# Patient Record
Sex: FEMALE | Race: WHITE | NOT HISPANIC OR LATINO | URBAN - METROPOLITAN AREA
[De-identification: names, ages, dates, MRNs, and addresses within clinical notes are randomized per-mention and may not be internally consistent; named-entity substitution may affect disease eponyms.]

---
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
## 2022-01-19 ENCOUNTER — OFFICE VISIT (OUTPATIENT)
Dept: FAMILY MEDICINE | Facility: CLINIC | Age: 22
End: 2022-01-19
Payer: COMMERCIAL

## 2022-01-19 VITALS
SYSTOLIC BLOOD PRESSURE: 125 MMHG | RESPIRATION RATE: 15 BRPM | TEMPERATURE: 98.4 F | BODY MASS INDEX: 20.49 KG/M2 | DIASTOLIC BLOOD PRESSURE: 80 MMHG | HEIGHT: 66 IN | OXYGEN SATURATION: 97 % | HEART RATE: 65 BPM | WEIGHT: 127.5 LBS

## 2022-01-19 DIAGNOSIS — Z00.00 ROUTINE GENERAL MEDICAL EXAMINATION AT A HEALTH CARE FACILITY: Primary | ICD-10-CM

## 2022-01-19 DIAGNOSIS — F41.9 ANXIETY: ICD-10-CM

## 2022-01-19 DIAGNOSIS — Z13.220 LIPID SCREENING: ICD-10-CM

## 2022-01-19 DIAGNOSIS — F42.2 MIXED OBSESSIONAL THOUGHTS AND ACTS: ICD-10-CM

## 2022-01-19 PROBLEM — F42.9 OBSESSIVE-COMPULSIVE DISORDER: Status: ACTIVE | Noted: 2018-03-13

## 2022-01-19 PROBLEM — F32.0 DEPRESSION, MAJOR, SINGLE EPISODE, MILD (H): Status: ACTIVE | Noted: 2018-03-13

## 2022-01-19 LAB
ANION GAP SERPL CALCULATED.3IONS-SCNC: 4 MMOL/L (ref 3–14)
BUN SERPL-MCNC: 9 MG/DL (ref 7–30)
CALCIUM SERPL-MCNC: 9 MG/DL (ref 8.5–10.1)
CHLORIDE BLD-SCNC: 104 MMOL/L (ref 94–109)
CHOLEST SERPL-MCNC: 162 MG/DL
CO2 SERPL-SCNC: 30 MMOL/L (ref 20–32)
CREAT SERPL-MCNC: 0.63 MG/DL (ref 0.52–1.04)
FASTING STATUS PATIENT QL REPORTED: NO
GFR SERPL CREATININE-BSD FRML MDRD: >90 ML/MIN/1.73M2
GLUCOSE BLD-MCNC: 84 MG/DL (ref 70–99)
HDLC SERPL-MCNC: 87 MG/DL
LDLC SERPL CALC-MCNC: 55 MG/DL
NONHDLC SERPL-MCNC: 75 MG/DL
POTASSIUM BLD-SCNC: 4.2 MMOL/L (ref 3.4–5.3)
SODIUM SERPL-SCNC: 138 MMOL/L (ref 133–144)
TRIGL SERPL-MCNC: 102 MG/DL

## 2022-01-19 PROCEDURE — 80061 LIPID PANEL: CPT | Performed by: FAMILY MEDICINE

## 2022-01-19 PROCEDURE — 82310 ASSAY OF CALCIUM: CPT | Performed by: FAMILY MEDICINE

## 2022-01-19 RX ORDER — CITALOPRAM HYDROBROMIDE 20 MG/1
20 TABLET ORAL DAILY
Qty: 90 TABLET | Refills: 3 | Status: SHIPPED | OUTPATIENT
Start: 2022-01-19 | End: 2022-12-20

## 2022-01-19 RX ORDER — CITALOPRAM HYDROBROMIDE 20 MG/1
20 TABLET ORAL
COMMUNITY
Start: 2020-02-29 | End: 2022-01-19

## 2022-01-19 ASSESSMENT — MIFFLIN-ST. JEOR: SCORE: 1360.47

## 2022-01-19 ASSESSMENT — ANXIETY QUESTIONNAIRES
2. NOT BEING ABLE TO STOP OR CONTROL WORRYING: MORE THAN HALF THE DAYS
7. FEELING AFRAID AS IF SOMETHING AWFUL MIGHT HAPPEN: SEVERAL DAYS
5. BEING SO RESTLESS THAT IT IS HARD TO SIT STILL: NOT AT ALL
GAD7 TOTAL SCORE: 6
6. BECOMING EASILY ANNOYED OR IRRITABLE: NOT AT ALL
IF YOU CHECKED OFF ANY PROBLEMS ON THIS QUESTIONNAIRE, HOW DIFFICULT HAVE THESE PROBLEMS MADE IT FOR YOU TO DO YOUR WORK, TAKE CARE OF THINGS AT HOME, OR GET ALONG WITH OTHER PEOPLE: NOT DIFFICULT AT ALL
3. WORRYING TOO MUCH ABOUT DIFFERENT THINGS: SEVERAL DAYS
1. FEELING NERVOUS, ANXIOUS, OR ON EDGE: MORE THAN HALF THE DAYS

## 2022-01-19 ASSESSMENT — PATIENT HEALTH QUESTIONNAIRE - PHQ9
5. POOR APPETITE OR OVEREATING: NOT AT ALL
SUM OF ALL RESPONSES TO PHQ QUESTIONS 1-9: 3

## 2022-01-20 ASSESSMENT — ANXIETY QUESTIONNAIRES: GAD7 TOTAL SCORE: 6

## 2022-06-09 NOTE — PROGRESS NOTES
ASSESSMENT AND PLAN:     (F41.1) BISI (generalized anxiety disorder)  (primary encounter diagnosis)  (F42.9) Obsessive-compulsive disorder, unspecified type  Comment: Chronic, not at goal.  Discussed options for improving anxiety symptoms including psychotherapy, increasing citalopram dose, switching agents entirely, and augmenting with buspirone.  Neha would like to avoid increasing citalopram at this time.  She is interested in therapy - I directed her to [facility] to explore options in her area.    Continue Citalopram 20mg daily  Start on buspirone and titrate up to 10mg twice a day    Follow up via MyChart or at visit (which can include Pap smear) before current prescription runs out.     Plan: busPIRone (BUSPAR) 5 MG tablet          (Z23) Need for diphtheria-tetanus-pertussis (Tdap) vaccine  Comment:   Plan: TDAP (ADACEL,BOOSTRIX)          Cristiano Diaz MD   [facility]  06/10/2022, 4:49 PM      SUBJECTIVE:   Neha is a 21 year old female who presents to clinic today for a return visit.    Recent Psychiatric History:  - feels that citalopram worked well for a while for symptoms, continues to work well for OCD syptoms  - feels that anxiety and depressive symptoms have been not perfect for a while now  - wondering if medication plan could be better  - was off citalopram for a year 2 years ago, and has been back on for a year  - anxiety symptoms more bothersome than depressive symptoms  - finds relaxing hard with other people - counterinuitively especially people she is comfortable with, not when she alone or with new people  - negatively impacted relationship with her partner at first  - doesn't isolate herself as a result of these symptoms      Previous Diagnoses:  OCD - first recognized around age 11, first prescribed medications age 16-18. Diagnosed by PCP.   BISI - age 16-18  Depression - age 17-7  Eating Disorder    - History of psychiatric hospitalization: no    Current  "Psychiatric Medications:  Citalopram 20mg - started age [age]. Stopped for a year 2 years ago. Symptoms worsened and restarted a year ago    Last time with a therapist was 4 years ago, did not connect well    Previous Medications:  No      PHQ-9 SCORE 1/19/2022   PHQ-9 Total Score 3     BISI-7 SCORE 1/19/2022   Total Score 6     - Are anxiety symptoms predominantly about one or more social situations in which the individual is exposed to possible scrutiny by others. Examples include social interactions (eg, having a conversation, meeting unfamiliar people), being observed (eg, eating or drinking), and performing in front of others (eg, giving a speech): no    - Discrete brief episodes of anxiety concerning for panic attacks: yes, maybe once or twice a month on average, some months not at all    Need 4 or more of the following:  - Palpitations, pounding heart, or accelerated heart rate: yes, feels like heart stops for a few moments  - Sweating: no  - Trembling or shaking: no  - Sensations of shortness of breath or smothering: yes  - Feelings of choking: no  - Chest pain or discomfort: yes, feels like heart is sinking  - Nausea or abdominal distress: no  - Feeling dizzy, unsteady, light-headed, or faint: no  - Chills or heat sensations: no  - Paresthesias: no  - Derealization or depersonalization (being detached from oneself): no  - Fear of losing control or \"going crazy\": no  - Fear of dying: yes    To meet criteria for Panic Disorder, must also be recurrent and have:  - Persistent concern or worry about additional panic attacks or their consequences: no  - A significant maladaptive change in behavior related to the attacks: no      History of Trauma:  Not that she is aware of    Substance Use History:  No smoking  No MJ or other drug use  Drinks 1-2 drinks on the weekends, 1 drink during the week    Safety:  - Any thoughts of hurting yourself or others?: no  - History of suicide attempts: no  - History of cutting or " self-harm: no  - Access to firearms: no    Family History:  Mother - OCD, severe anxiety, mild depression        Patient Active Problem List   Diagnosis     Depression, major, single episode, mild (H)     Moderate anxiety     Obsessive-compulsive disorder     Current Outpatient Medications   Medication     citalopram (CELEXA) 20 MG tablet     No current facility-administered medications for this visit.       I have reviewed the patient's relevant past medical history.     OBJECTIVE:   /85 (BP Location: Right arm, Patient Position: Sitting, Cuff Size: Adult Regular)   Pulse 75   Temp 98.2  F (36.8  C) (Skin)   Resp 12   Wt 57.7 kg (127 lb 4 oz)   LMP 05/20/2022 (Within Days)   SpO2 99%   Breastfeeding No   BMI 20.33 kg/m      Constitutional: well-appearing, appears stated age  Eyes: conjunctivae without erythema, sclera anicteric.   Skin: no rashes, lesions, or wounds  Psych: affect is full and appropriate, speech is fluent and non-pressured

## 2022-06-10 ENCOUNTER — OFFICE VISIT (OUTPATIENT)
Dept: FAMILY MEDICINE | Facility: CLINIC | Age: 22
End: 2022-06-10
Payer: COMMERCIAL

## 2022-06-10 VITALS
OXYGEN SATURATION: 99 % | TEMPERATURE: 98.2 F | BODY MASS INDEX: 20.33 KG/M2 | SYSTOLIC BLOOD PRESSURE: 120 MMHG | HEART RATE: 75 BPM | DIASTOLIC BLOOD PRESSURE: 85 MMHG | RESPIRATION RATE: 12 BRPM | WEIGHT: 127.25 LBS

## 2022-06-10 DIAGNOSIS — F41.1 GAD (GENERALIZED ANXIETY DISORDER): Primary | ICD-10-CM

## 2022-06-10 DIAGNOSIS — F42.9 OBSESSIVE-COMPULSIVE DISORDER, UNSPECIFIED TYPE: ICD-10-CM

## 2022-06-10 DIAGNOSIS — Z23 NEED FOR DIPHTHERIA-TETANUS-PERTUSSIS (TDAP) VACCINE: ICD-10-CM

## 2022-06-10 RX ORDER — BUSPIRONE HYDROCHLORIDE 5 MG/1
TABLET ORAL
Qty: 105 TABLET | Refills: 0 | Status: SHIPPED | OUTPATIENT
Start: 2022-06-10 | End: 2023-04-13

## 2022-06-10 ASSESSMENT — ANXIETY QUESTIONNAIRES
IF YOU CHECKED OFF ANY PROBLEMS ON THIS QUESTIONNAIRE, HOW DIFFICULT HAVE THESE PROBLEMS MADE IT FOR YOU TO DO YOUR WORK, TAKE CARE OF THINGS AT HOME, OR GET ALONG WITH OTHER PEOPLE: SOMEWHAT DIFFICULT
7. FEELING AFRAID AS IF SOMETHING AWFUL MIGHT HAPPEN: MORE THAN HALF THE DAYS
GAD7 TOTAL SCORE: 14
GAD7 TOTAL SCORE: 14
2. NOT BEING ABLE TO STOP OR CONTROL WORRYING: NEARLY EVERY DAY
4. TROUBLE RELAXING: SEVERAL DAYS
1. FEELING NERVOUS, ANXIOUS, OR ON EDGE: NEARLY EVERY DAY
6. BECOMING EASILY ANNOYED OR IRRITABLE: SEVERAL DAYS
3. WORRYING TOO MUCH ABOUT DIFFERENT THINGS: NEARLY EVERY DAY
5. BEING SO RESTLESS THAT IT IS HARD TO SIT STILL: SEVERAL DAYS

## 2022-06-10 ASSESSMENT — PATIENT HEALTH QUESTIONNAIRE - PHQ9: SUM OF ALL RESPONSES TO PHQ QUESTIONS 1-9: 8

## 2022-06-10 NOTE — NURSING NOTE
21 year old  Chief Complaint   Patient presents with     Anxiety     Depression       Blood pressure 120/85, pulse 75, temperature 98.2  F (36.8  C), temperature source Skin, resp. rate 12, weight 57.7 kg (127 lb 4 oz), last menstrual period 05/20/2022, SpO2 99 %, not currently breastfeeding. Body mass index is 20.33 kg/m .  Patient Active Problem List   Diagnosis     Depression, major, single episode, mild (H)     Moderate anxiety     Obsessive-compulsive disorder       Wt Readings from Last 2 Encounters:   06/10/22 57.7 kg (127 lb 4 oz)   01/19/22 57.8 kg (127 lb 8 oz)     BP Readings from Last 3 Encounters:   06/10/22 120/85   01/19/22 125/80         Current Outpatient Medications   Medication     citalopram (CELEXA) 20 MG tablet     No current facility-administered medications for this visit.       Social History     Tobacco Use     Smoking status: Never Smoker     Smokeless tobacco: Never Used   Substance Use Topics     Alcohol use: Yes     Comment: 2x per week     Drug use: Never       Health Maintenance Due   Topic Date Due     CHLAMYDIA SCREENING  Never done     ADVANCE CARE PLANNING  Never done     DEPRESSION ACTION PLAN  Never done     DTAP/TDAP/TD IMMUNIZATION (2 - Td or Tdap) 09/12/2012     HIV SCREENING  Never done     HEPATITIS C SCREENING  Never done     PAP  Never done       No results found for: PAP      Анна 10, 2022 4:18 PM

## 2022-06-10 NOTE — NURSING NOTE
Prior to immunization administration, verified patients identity using patient s name and date of birth. Please see Immunization Activity for additional information.     Screening Questionnaire for Adult Immunization    Are you sick today?   No   Do you have allergies to medications, food, a vaccine component or latex?   No   Have you ever had a serious reaction after receiving a vaccination?   No   Do you have a long-term health problem with heart, lung, kidney, or metabolic disease (e.g., diabetes), asthma, a blood disorder, no spleen, complement component deficiency, a cochlear implant, or a spinal fluid leak?  Are you on long-term aspirin therapy?   No   Do you have cancer, leukemia, HIV/AIDS, or any other immune system problem?   No   Do you have a parent, brother, or sister with an immune system problem?   No   In the past 3 months, have you taken medications that affect  your immune system, such as prednisone, other steroids, or anticancer drugs; drugs for the treatment of rheumatoid arthritis, Crohn s disease, or psoriasis; or have you had radiation treatments?   No   Have you had a seizure, or a brain or other nervous system problem?   No   During the past year, have you received a transfusion of blood or blood    products, or been given immune (gamma) globulin or antiviral drug?   No   For women: Are you pregnant or is there a chance you could become       pregnant during the next month?   No   Have you received any vaccinations in the past 4 weeks?   No     Immunization questionnaire answers were all negative.        Per orders of Dr. Diaz, injection of Tdap given by Danielle Gross CMA. Patient instructed to remain in clinic for 15 minutes afterwards, and to report any adverse reaction to me immediately.       Screening performed by Danielle Gross CMA on 6/10/2022 at 5:08 PM.

## 2022-07-27 NOTE — PROGRESS NOTES
"  Assessment & Plan   Problem List Items Addressed This Visit    None     Visit Diagnoses     RUQ abdominal pain    -  Primary    Relevant Orders    Lipase    Hepatic panel    CBC with platelets differential    US abdomen complete        Suspicion for possible passed gallstone. Less likely pancreatitis or hepatic issue. Less likely constipation or PUD. Less likely kidney stone or infection. Will follow up labs and imaging as indicated.      22 minutes spent on the date of the encounter doing chart review, history and exam, documentation and further activities as noted.    Umesh Rocha MD  [facility]    Juan Solomon is a 21 year old presenting for the following health issues:  Pain (Under right rib cage area, started about 3 days ago. Still having pain today but only when she is drinking something.)      HPI   New pain under RIGHT rib cage  - started three days ago while she was working  - was much more severe, over the past three years symptoms have improved  - now says she only really notices symptoms when she drinks fluids  - denies pain penetrating through to her back  - no nausea, vomiting, constipation, diarrhea, blood with BMs  - no dysuria, hematuria  - no fever, chills, general body aches, cough, sore throat  - no identifiable association with eating fatty foods  - no previous history of similar symptoms      Review of Systems   Constitutional, HEENT, cardiovascular, pulmonary, gi and gu systems are negative, except as otherwise noted.      Objective    /78   Pulse 85   Temp 98.2  F (36.8  C)   Ht 1.685 m (5' 6.34\")   Wt 57.6 kg (127 lb)   LMP 07/24/2022 (Exact Date)   SpO2 96%   Breastfeeding No   BMI 20.29 kg/m    Body mass index is 20.29 kg/m .  Physical Exam   GENERAL: healthy, alert and no distress  NECK: no adenopathy, no asymmetry, masses, or scars and thyroid normal to palpation  RESP: lungs clear to auscultation - no rales, rhonchi or wheezes  CV: regular rate and " rhythm, normal S1 S2, no S3 or S4, no murmur, click or rub, no peripheral edema and peripheral pulses strong  ABDOMEN: soft, nontender, no hepatosplenomegaly, no masses and bowel sounds normal  MS: no gross musculoskeletal defects noted, no edema              .  ..

## 2022-07-28 ENCOUNTER — OFFICE VISIT (OUTPATIENT)
Dept: FAMILY MEDICINE | Facility: CLINIC | Age: 22
End: 2022-07-28
Payer: COMMERCIAL

## 2022-07-28 VITALS
BODY MASS INDEX: 20.41 KG/M2 | HEART RATE: 85 BPM | SYSTOLIC BLOOD PRESSURE: 120 MMHG | TEMPERATURE: 98.2 F | HEIGHT: 66 IN | OXYGEN SATURATION: 96 % | DIASTOLIC BLOOD PRESSURE: 78 MMHG | WEIGHT: 127 LBS

## 2022-07-28 DIAGNOSIS — R10.11 RUQ ABDOMINAL PAIN: Primary | ICD-10-CM

## 2022-07-28 LAB
ALBUMIN SERPL BCG-MCNC: 5 G/DL (ref 3.5–5.2)
ALP SERPL-CCNC: 38 U/L (ref 35–104)
ALT SERPL W P-5'-P-CCNC: 9 U/L (ref 10–35)
AST SERPL W P-5'-P-CCNC: 20 U/L (ref 10–35)
BASO+EOS+MONOS # BLD AUTO: 0.3 10E3/UL (ref 0–2.2)
BASO+EOS+MONOS NFR BLD AUTO: 5 %
BILIRUB DIRECT SERPL-MCNC: <0.2 MG/DL (ref 0–0.3)
BILIRUB SERPL-MCNC: 0.5 MG/DL
ERYTHROCYTE [DISTWIDTH] IN BLOOD BY AUTOMATED COUNT: 13.6 % (ref 10–15)
HCT VFR BLD AUTO: 40.1 % (ref 35–47)
HGB BLD-MCNC: 13.1 G/DL (ref 11.7–15.7)
LIPASE SERPL-CCNC: 18 U/L (ref 13–60)
LYMPHOCYTES # BLD AUTO: 1.6 10E3/UL (ref 0.8–5.3)
LYMPHOCYTES NFR BLD AUTO: 27 %
MCH RBC QN AUTO: 30.2 PG (ref 26.5–33)
MCHC RBC AUTO-ENTMCNC: 32.7 G/DL (ref 31.5–36.5)
MCV RBC AUTO: 92 FL (ref 78–100)
NEUTROPHILS # BLD AUTO: 3.9 10E3/UL (ref 1.6–8.3)
NEUTROPHILS NFR BLD AUTO: 68 %
PLATELET # BLD AUTO: 300 10E3/UL (ref 150–450)
PROT SERPL-MCNC: 7.7 G/DL (ref 6.4–8.3)
RBC # BLD AUTO: 4.34 10E6/UL (ref 3.8–5.2)
WBC # BLD AUTO: 5.8 10E3/UL (ref 4–11)

## 2022-07-28 PROCEDURE — 80076 HEPATIC FUNCTION PANEL: CPT | Performed by: FAMILY MEDICINE

## 2022-07-28 PROCEDURE — 83690 ASSAY OF LIPASE: CPT | Performed by: FAMILY MEDICINE

## 2022-07-28 ASSESSMENT — PAIN SCALES - GENERAL: PAINLEVEL: MODERATE PAIN (4)

## 2022-07-29 ENCOUNTER — ANCILLARY PROCEDURE (OUTPATIENT)
Dept: ULTRASOUND IMAGING | Facility: CLINIC | Age: 22
End: 2022-07-29
Attending: FAMILY MEDICINE

## 2022-07-29 DIAGNOSIS — R10.11 RUQ ABDOMINAL PAIN: ICD-10-CM

## 2022-07-29 PROCEDURE — 76700 US EXAM ABDOM COMPLETE: CPT | Mod: GC | Performed by: RADIOLOGY

## 2022-10-03 ENCOUNTER — HEALTH MAINTENANCE LETTER (OUTPATIENT)
Age: 22
End: 2022-10-03

## 2022-11-30 ENCOUNTER — E-VISIT (OUTPATIENT)
Dept: URGENT CARE | Facility: CLINIC | Age: 22
End: 2022-11-30
Payer: COMMERCIAL

## 2022-11-30 DIAGNOSIS — H92.09 OTALGIA, UNSPECIFIED LATERALITY: Primary | ICD-10-CM

## 2022-11-30 PROCEDURE — 99207 PR NON-BILLABLE SERV PER CHARTING: CPT | Performed by: INTERNAL MEDICINE

## 2022-11-30 NOTE — PATIENT INSTRUCTIONS
Dear Neha Farr,    We are sorry you are not feeling well. Based on the responses you provided, it is recommended that you be seen in-person in urgent care so we can better evaluate your symptoms and examine your ear. Please click here to find the nearest urgent care location to you.   You will not be charged for this Visit. Thank you for trusting us with your care.    Muna Telles MD

## 2022-12-20 ENCOUNTER — OFFICE VISIT (OUTPATIENT)
Dept: BEHAVIORAL HEALTH | Facility: CLINIC | Age: 22
End: 2022-12-20

## 2022-12-20 ENCOUNTER — OFFICE VISIT (OUTPATIENT)
Dept: FAMILY MEDICINE | Facility: CLINIC | Age: 22
End: 2022-12-20
Payer: COMMERCIAL

## 2022-12-20 VITALS
OXYGEN SATURATION: 100 % | SYSTOLIC BLOOD PRESSURE: 110 MMHG | DIASTOLIC BLOOD PRESSURE: 72 MMHG | HEART RATE: 71 BPM | BODY MASS INDEX: 21.73 KG/M2 | TEMPERATURE: 97.8 F | RESPIRATION RATE: 15 BRPM | WEIGHT: 136 LBS

## 2022-12-20 DIAGNOSIS — F41.1 GAD (GENERALIZED ANXIETY DISORDER): Primary | Chronic | ICD-10-CM

## 2022-12-20 DIAGNOSIS — F41.9 ANXIETY: Primary | ICD-10-CM

## 2022-12-20 RX ORDER — PROPRANOLOL HYDROCHLORIDE 10 MG/1
10 TABLET ORAL 3 TIMES DAILY PRN
Qty: 30 TABLET | Refills: 1 | Status: SHIPPED | OUTPATIENT
Start: 2022-12-20 | End: 2023-05-18

## 2022-12-20 RX ORDER — CITALOPRAM HYDROBROMIDE 40 MG/1
40 TABLET ORAL DAILY
Qty: 30 TABLET | Refills: 1 | Status: SHIPPED | OUTPATIENT
Start: 2022-12-20 | End: 2023-03-08

## 2022-12-20 ASSESSMENT — PATIENT HEALTH QUESTIONNAIRE - PHQ9
SUM OF ALL RESPONSES TO PHQ QUESTIONS 1-9: 4
5. POOR APPETITE OR OVEREATING: SEVERAL DAYS

## 2022-12-20 ASSESSMENT — ANXIETY QUESTIONNAIRES
7. FEELING AFRAID AS IF SOMETHING AWFUL MIGHT HAPPEN: NEARLY EVERY DAY
GAD7 TOTAL SCORE: 15
3. WORRYING TOO MUCH ABOUT DIFFERENT THINGS: NEARLY EVERY DAY
IF YOU CHECKED OFF ANY PROBLEMS ON THIS QUESTIONNAIRE, HOW DIFFICULT HAVE THESE PROBLEMS MADE IT FOR YOU TO DO YOUR WORK, TAKE CARE OF THINGS AT HOME, OR GET ALONG WITH OTHER PEOPLE: SOMEWHAT DIFFICULT
2. NOT BEING ABLE TO STOP OR CONTROL WORRYING: NEARLY EVERY DAY
6. BECOMING EASILY ANNOYED OR IRRITABLE: MORE THAN HALF THE DAYS
5. BEING SO RESTLESS THAT IT IS HARD TO SIT STILL: NOT AT ALL
GAD7 TOTAL SCORE: 15
1. FEELING NERVOUS, ANXIOUS, OR ON EDGE: NEARLY EVERY DAY

## 2022-12-20 NOTE — PROGRESS NOTES
"  Assessment & Plan   Problem List Items Addressed This Visit    None  Visit Diagnoses     Anxiety    -  Primary    Relevant Medications    citalopram (CELEXA) 40 MG tablet    propranolol (INDERAL) 10 MG tablet         Will increase citalopram today to 40mg daily. Considered tapering off Buspar but will continue this for now. Considered transitioning to an SNRI but will hold off for now. Discussed acute anxiolytic medications including propranolol vs benzodiazepine. Neha agrees to a trial of propranolol today. Will monitor for safety and efficacy with new changes and adjust accordingly    Neha also expressed interest in a warm hand-off with clinic [facility] today which I facilitated.     35 minutes spent on the date of the encounter doing chart review, history and exam, documentation and further activities as noted.    Umesh Rocha MD  [facility]    Subjective   Neha is a 22 year old presenting for the following health issues:  Recheck Medication (Talk about anxiety and medication )      HPI   \"The current medication I was prescribed was 20mg and when I picked up my prescription it was 10mg but I was too nervous to say anything and hurt anyone s feelings. I want to address my panic attacks where I am unable to function and the only solution is to have alcohol, which is a major issue.\"  - met with PCP, Dr. Diaz in June of this year; started on Buspar in addition to previously prescribed Citalopram  - was referred to [employer] web site for possible psychology or psychiatry  Today  - reports anxiety is getting worse  PHQ 1/19/2022 6/10/2022 12/20/2022   PHQ-9 Total Score 3 8 4   Q9: Thoughts of better off dead/self-harm past 2 weeks Not at all Not at all Not at all     BISI-7 SCORE 1/19/2022 6/10/2022 12/20/2022   Total Score 6 14 15       Review of Systems   Constitutional, HEENT, cardiovascular, pulmonary, gi and gu systems are negative, except as otherwise noted.      Objective    /72 (BP " Location: Left arm, Patient Position: Sitting, Cuff Size: Adult Regular)   Pulse 71   Temp 97.8  F (36.6  C) (Skin)   Resp 15   Wt 61.7 kg (136 lb)   LMP 12/16/2022   SpO2 100%   BMI 21.73 kg/m    Body mass index is 21.73 kg/m .  Physical Exam   GENERAL: healthy, alert and no distress  NECK: no adenopathy, no asymmetry, masses, or scars and thyroid normal to palpation  RESP: lungs clear to auscultation - no rales, rhonchi or wheezes  CV: regular rate and rhythm, normal S1 S2, no S3 or S4, no murmur, click or rub, no peripheral edema and peripheral pulses strong  ABDOMEN: soft, nontender, no hepatosplenomegaly, no masses and bowel sounds normal  MS: no gross musculoskeletal defects noted, no edema

## 2022-12-20 NOTE — PROGRESS NOTES
Patient had appointment with her primary care physician. [facility] services were offered. No immediate safety/risk issues were reported or identified.  Explained the role of the [facility], provided contact information for the [facility], and informed pt how to obtain more information regarding behavioral health benefits offered by her healthcare plan.   Patient expressed interest in [facility] services and affirmed ability to schedule appt.      Shawn G. Ullrich, [facility], Behavioral Health Clinician

## 2022-12-20 NOTE — NURSING NOTE
22 year old  Chief Complaint   Patient presents with     Recheck Medication     Talk about anxiety and medication        Blood pressure 110/72, pulse 71, temperature 97.8  F (36.6  C), temperature source Skin, resp. rate 15, weight 61.7 kg (136 lb), last menstrual period 12/16/2022, SpO2 100 %, not currently breastfeeding. Body mass index is 21.73 kg/m .  Patient Active Problem List   Diagnosis     Depression, major, single episode, mild (H)     BISI (generalized anxiety disorder)     Obsessive-compulsive disorder       Wt Readings from Last 2 Encounters:   12/20/22 61.7 kg (136 lb)   07/28/22 57.6 kg (127 lb)     BP Readings from Last 3 Encounters:   12/20/22 110/72   07/28/22 120/78   06/10/22 120/85         Current Outpatient Medications   Medication     citalopram (CELEXA) 20 MG tablet     busPIRone (BUSPAR) 5 MG tablet     No current facility-administered medications for this visit.       Social History     Tobacco Use     Smoking status: Never     Smokeless tobacco: Never   Substance Use Topics     Alcohol use: Yes     Comment: 2x per week     Drug use: Never       Health Maintenance Due   Topic Date Due     CHLAMYDIA SCREENING  Never done     ADVANCE CARE PLANNING  Never done     DEPRESSION ACTION PLAN  Never done     HIV SCREENING  Never done     HEPATITIS C SCREENING  Never done     PAP  Never done     INFLUENZA VACCINE (1) 09/01/2022     PHQ-9  12/10/2022     DTAP/TDAP/TD IMMUNIZATION (3 - Td or Tdap) 12/10/2022     YEARLY PREVENTIVE VISIT  01/19/2023       No results found for: PAP      December 20, 2022 8:07 AM

## 2023-01-01 ENCOUNTER — E-VISIT (OUTPATIENT)
Dept: URGENT CARE | Facility: CLINIC | Age: 23
End: 2023-01-01
Payer: COMMERCIAL

## 2023-01-01 DIAGNOSIS — H10.31 ACUTE BACTERIAL CONJUNCTIVITIS OF RIGHT EYE: Primary | ICD-10-CM

## 2023-01-01 PROCEDURE — 99421 OL DIG E/M SVC 5-10 MIN: CPT | Performed by: PHYSICIAN ASSISTANT

## 2023-01-01 RX ORDER — POLYMYXIN B SULFATE AND TRIMETHOPRIM 1; 10000 MG/ML; [USP'U]/ML
SOLUTION OPHTHALMIC
Qty: 10 ML | Refills: 0 | Status: SHIPPED | OUTPATIENT
Start: 2023-01-01 | End: 2023-01-08

## 2023-01-02 NOTE — PATIENT INSTRUCTIONS
Thank you for choosing us for your care. I have placed an order for a prescription so that you can start treatment. View your full visit summary for details by clicking on the link below. Your pharmacist will able to address any questions you may have about the medication.     If you re not feeling better within 2-3 days, please schedule an appointment.  You can schedule an appointment right here in [facility], or call [phone]  If the visit is for the same symptoms as your eVisit, we ll refund the cost of your eVisit if seen within seven days.

## 2023-03-08 DIAGNOSIS — F41.9 ANXIETY: ICD-10-CM

## 2023-03-08 RX ORDER — CITALOPRAM HYDROBROMIDE 40 MG/1
40 TABLET ORAL DAILY
Qty: 30 TABLET | Refills: 3 | Status: SHIPPED | OUTPATIENT
Start: 2023-03-08 | End: 2023-04-13

## 2023-03-08 NOTE — TELEPHONE ENCOUNTER
Citalopram (Celexa) 40 mg    Last Office Visit: 12/20/22  [facility] Appointments: None  Medication last refilled: 12/20/22 #30 with 1 refill(s)    BISI-7 SCORE 1/19/2022 6/10/2022 12/20/2022   Total Score 6 14 15     Last PHQ-9 1/19/2022 6/10/2022 12/20/2022   PHQ-9 Total Score 3 8 4     Prescription approved per [facility] Refill Protocol.    Daylin Morales, MEGHANN, RN, CCM

## 2023-03-23 ENCOUNTER — E-VISIT (OUTPATIENT)
Dept: URGENT CARE | Facility: CLINIC | Age: 23
End: 2023-03-23
Payer: COMMERCIAL

## 2023-03-23 DIAGNOSIS — J06.9 VIRAL URI: Primary | ICD-10-CM

## 2023-03-23 PROCEDURE — 99421 OL DIG E/M SVC 5-10 MIN: CPT | Performed by: NURSE PRACTITIONER

## 2023-03-24 NOTE — PATIENT INSTRUCTIONS
Viral Upper Respiratory Illness (Adult)  You have a viral upper respiratory illness (URI), which is another term for the common cold. This viral illness is contagious during the first few days. It's spread through the air by coughing and sneezing. It may also be spread by direct contact (touching the sick person and then touching your own eyes, nose, or mouth). Frequent handwashing will lower risk of spread. Most viral illnesses go away within 7 to 10 days with rest and simple home remedies. Sometimes the illness may last for several weeks. Antibiotics will not kill a virus, and they are generally not prescribed for this condition.   Home care    If symptoms are severe, rest at home for the first 2 to 3 days or as advised. When you resume activity, don't let yourself get too tired.    Don't smoke. If you need help stopping, talk with your healthcare provider.    Stay away from cigarette smoke (yours or others ).    You may use acetaminophen or ibuprofen to control pain and fever, unless another medicine was prescribed. Talk with your provider before taking these medicines if you have chronic liver or kidney disease. Also talk with your provider if you've had a stomach ulcer or digestive bleeding, or you take blood-thinning medicines. Never give aspirin to anyone under 18 years of age who is ill with a viral infection or fever. It may cause severe liver or brain damage, or even death.    Your appetite may be poor, so a light diet is OK. Stay well hydrated by drinking 6 to 8 glasses of fluids per day (water, soft drinks, juices, tea, or soup). Extra fluids will help loosen secretions in the nose and lungs.    Over-the-counter cold medicines will not shorten the length of time you re sick. But they may be helpful for cough, sore throat, and nasal and sinus congestion. If you take prescription medicines, ask your healthcare provider or pharmacist which over-the-counter medicines are safe to use. Don't use  decongestants, if you have high blood pressure, without first talking with your healthcare provider.    If you are taking other prescribed medicine, contact your healthcare provider before taking any over-the-counter medicines.    Follow-up care  Follow up with your healthcare provider, or as advised.  When to seek medical advice  Call your healthcare provider right away if any of these occur:    Cough with lots of colored sputum (mucus)    Severe headache; face, neck, or ear pain    Difficulty swallowing due to throat pain    Fever of 100.4 F (38 C) or higher , or as directed by your healthcare provider  Call 911  Call 911 if any of these occur:     Chest pain, shortness of breath, wheezing, or difficulty breathing    Coughing up blood    Very severe pain with swallowing, especially if it goes along with a muffled voice    Feeling of doom    Feeling dizzy, faint, or confused    Lips or skin is blue, purple or gray in color  [employer] last reviewed this educational content on 1/1/2022 2000-2022 [employer]. All rights reserved. This information is not intended as a substitute for professional medical care. Always follow your healthcare professional's instructions.         The symptoms you describe suggest a viral cause, which is much more common than a bacterial cause. Antibiotics will treat bacterial infections, but have no effect on viral infections. If possible, especially if improving, start with symptom care for the first 7-10 days, then consider seeking further treatment or taking an antibiotic. Bacterial infections generally are more severe, including symptoms such as pus, fever over 101degrees F, or rapidly worsening.

## 2023-03-29 ENCOUNTER — E-VISIT (OUTPATIENT)
Dept: URGENT CARE | Facility: CLINIC | Age: 23
End: 2023-03-29
Payer: COMMERCIAL

## 2023-03-29 DIAGNOSIS — B96.89 ACUTE BACTERIAL SINUSITIS: Primary | ICD-10-CM

## 2023-03-29 DIAGNOSIS — J01.90 ACUTE BACTERIAL SINUSITIS: Primary | ICD-10-CM

## 2023-03-29 PROCEDURE — 99421 OL DIG E/M SVC 5-10 MIN: CPT | Performed by: PHYSICIAN ASSISTANT

## 2023-03-29 NOTE — PATIENT INSTRUCTIONS
1.  Take antibiotic according to instructions, with food to prevent stomach upset. If you are prone to stomach upset with antibiotics, I recommend adding a probiotic to this regimen.  Culturelle is a trusted brand.  2.  I recommend using Mucinex to help thin mucus secretions.  Adding a nasal steroid spray such as Flonase can also be helpful with clearing sinus congestion.  3.  Take Tylenol 650mg every 4 hours or ibuprofen 600mg every 6 hours by mouth for pain/fever.  Do not exceed 4000mg of acetaminophen or 2400mg of ibuprofen from any source in a 24 hour period.  Taking Tylenol and ibuprofen together may be helpful in reducing pain.   4.  Follow-up if you not having any improvement in your symptoms over the next 5 days.    Sinusitis  The sinuses are air-filled spaces within the bones of the face. They connect to the inside of the nose. Sinusitis is an inflammation of the tissue that lines the sinuses. Sinusitis can occur during a cold. It can also happen due to allergies to pollens and other particles in the air. Sinusitis can cause symptoms of sinus congestion and a feeling of fullness. A sinus infection causes fever, headache, and facial pain. There is often green or yellow fluid draining from the nose or into the back of the throat (post-nasal drip). You have been given antibiotics to treat this condition.  Home care    Take the full course of antibiotics as instructed. Do not stop taking them, even when you feel better.    Drink plenty of water, hot tea, and other liquids. This may help thin nasal mucus. It also may help your sinuses drain fluids.    Heat may help soothe painful areas of your face. Use a towel soaked in hot water. Or,  the shower and direct the warm spray onto your face. Using a vaporizer along with a menthol rub at night may also help soothe symptoms.     An expectorant with guaifenesin may help thin nasal mucus and help your sinuses drain fluids.    You can use an over-the-counter  decongestant, unless a similar medicine was prescribed to you. Nasal sprays work the fastest. Use one that contains phenylephrine or oxymetazoline. First blow your nose gently. Then use the spray. Do not use these medicines more often than directed on the label. If you do, your symptoms may get worse. You may also take pills that contain pseudoephedrine. Don t use products that combine multiple medicines. This is because side effects may be increased. Read labels. You can also ask the pharmacist for help. (People with high blood pressure should not use decongestants. They can raise blood pressure.)    Over-the-counter antihistamines may help if allergies contributed to your sinusitis.      Do not use nasal rinses or irrigation during an acute sinus infection, unless your healthcare provider tells you to. Rinsing may spread the infection to other areas in your sinuses.    Use acetaminophen or ibuprofen to control pain, unless another pain medicine was prescribed to you. If you have chronic liver or kidney disease or ever had a stomach ulcer, talk with your healthcare provider before using these medicines. (Aspirin should never be taken by anyone under age 18 who is ill with a fever. It may cause severe liver damage.)    Don't smoke. This can make symptoms worse.  Follow-up care  Follow up with your healthcare provider or our staff if you are better in 1 week.  When to seek medical advice  Call your healthcare provider if any of these occur:    Facial pain or headache that gets worse    Stiff neck    Unusual drowsiness or confusion    Swelling of your forehead or eyelids    Vision problems, such as blurred or double vision    Fever of 100.4 F (38 C) or higher, or as directed by your healthcare provider    Seizure    Breathing problems    Symptoms don't go away in 10 days  Prevention  Here are steps you can take to help prevent an infection:    Keep good hand washing habits.    Don t have close contact with people who  have sore throats, colds, or other upper respiratory infections.    Don t smoke, and stay away from secondhand smoke.    Stay up to date with of your vaccines.  Date Last Reviewed: 11/1/2017 2000-2017 [employer]. [address] [place] [address], [place] [address]. All rights reserved. This information is not intended as a substitute for professional medical care. Always follow your healthcare professional's instructions.

## 2023-04-06 ENCOUNTER — E-VISIT (OUTPATIENT)
Dept: URGENT CARE | Facility: CLINIC | Age: 23
End: 2023-04-06
Payer: COMMERCIAL

## 2023-04-06 DIAGNOSIS — J01.90 ACUTE SINUSITIS WITH SYMPTOMS > 10 DAYS: Primary | ICD-10-CM

## 2023-04-06 PROCEDURE — 99421 OL DIG E/M SVC 5-10 MIN: CPT | Performed by: NURSE PRACTITIONER

## 2023-04-06 RX ORDER — DOXYCYCLINE HYCLATE 100 MG
100 TABLET ORAL 2 TIMES DAILY
Qty: 14 TABLET | Refills: 0 | Status: SHIPPED | OUTPATIENT
Start: 2023-04-06 | End: 2023-04-13

## 2023-04-07 NOTE — PATIENT INSTRUCTIONS
Sinusitis (Antibiotic Treatment)    The sinuses are air-filled spaces within the bones of the face. They connect to the inside of the nose. Sinusitis is an inflammation of the tissue that lines the sinuses. Sinusitis can occur during a cold. It can also happen due to allergies to pollens and other particles in the air. Sinusitis can cause symptoms of sinus congestion and a feeling of fullness. A sinus infection causes fever, headache, and facial pain. There is often green or yellow fluid draining from the nose or into the back of the throat (post-nasal drip). You have been given antibiotics to treat this condition.   Home care    Take the full course of antibiotics as instructed. Don't stop taking them, even when you feel better.    Drink plenty of water, hot tea, and other liquids as directed by the healthcare provider. This may help thin nasal mucus. It also may help your sinuses drain fluids.    Heat may help soothe painful areas of your face. Use a towel soaked in hot water. Or,  the shower and direct the warm spray onto your face. Using a vaporizer along with a menthol rub at night may also help soothe symptoms.     An expectorant with guaifenesin may help thin nasal mucus and help your sinuses drain fluids. Talk with your provider or pharmacists before taking an over-the-counter (OTC) medicine if you have any questions about it or its side effects..    You can use an OTC decongestant, unless a similar medicine was prescribed to you. Nasal sprays work the fastest. Use one that contains phenylephrine or oxymetazoline. First blow your nose gently. Then use the spray. Don't use these medicines more often than directed on the label. If you do, your symptoms may get worse. You may also take pills that contain pseudoephedrine. Don t use products that combine multiple medicines. This is because side effects may be increased. Read labels. You can also ask the pharmacist for help. (People with high blood  pressure should not use decongestants. They can raise blood pressure.) Talk with your provider or pharmacist if you have any questions about the medicine..    OTC antihistamines may help if allergies contributed to your sinusitis. Talk with your provider or pharmacist if you have any questions about the medicine.    Nasal rinses or irrigation may help symptoms. It's very important to use these products only as directed. Use sterile water or sterile saline solution and not tap water. Tap water may contain germs that can cause infection in the brain. Don't rinse with excess pressure. this may spread the infection to other areas in your sinuses or head. Ask your healthcare provider or pharmacist if you have questions about using these products.    Use acetaminophen, naproxen, or ibuprofen to control pain, unless another pain medicine was prescribed to you. Talk with your healthcare provider before using these medicines if you have chronic liver or kidney disease or ever had a stomach ulcer. Never give aspirin to anyone under age 18 without first talking to their healthcare provider. It may cause severe liver damage.    Don't smoke. This can make symptoms worse.    Follow-up care  Follow up with your healthcare provider as advised.   When to seek medical advice  Call your healthcare provider if any of these occur:     Facial pain or headache that gets worse    Symptoms don't go away in 10 days    Fever of 100.4 F (38 C) or higher, or as directed by your healthcare provider  Call 911  Call 911 if any of these occur:     Seizure    Trouble breathing    Feeling dizzy or faint    Fingernails, skin, or lips look blue, purple, or gray    Severe headache that doesn't go away    Stiff neck    Unusual drowsiness or confusion    Vision problems such as blurred or double vision    Swelling of your forehead or eyelids  Prevention  Here are steps you can take to help prevent an infection:     Keep good hand washing habits.    Don t  have close contact with people who have sore throats, colds, or other upper respiratory infections.    Don t smoke, and stay away from secondhand smoke.    Stay up to date with all of your vaccines.  [employer] last reviewed this educational content on 1/1/2022 2000-2022 [employer]. All rights reserved. This information is not intended as a substitute for professional medical care. Always follow your healthcare professional's instructions.        Dear Neha Farr    After reviewing your responses, I've been able to diagnose you with sinusitis. It seems like the first antibiotic didn't work.     Based on your responses and diagnosis, I have prescribed doxycycline  to treat your symptoms. I have sent this to your pharmacy.?     It is also important to stay well hydrated, get lots of rest and take over-the-counter decongestants,?tylenol?or ibuprofen if you?are able to?take those medications per your primary care provider to help relieve discomfort.?     It is important that you take?all of?your prescribed medication even if your symptoms are improving after a few doses.? Taking?all of?your medicine helps prevent the symptoms from returning.?     If your symptoms worsen, you develop severe headache, vomiting, high fever (>102), or are not improving in 7 days, please contact your primary care provider for an appointment or visit any of our convenient Walk-in Care or Urgent Care Centers to be seen which can be found on our website?here.?     Thanks again for choosing?us?as your health care partner,?   ?  Rafa Hawkins NP?

## 2023-04-12 NOTE — PROGRESS NOTES
Assessment & Plan   Problem List Items Addressed This Visit        Behavioral    BISI (generalized anxiety disorder) - Primary (Chronic)    Relevant Medications    DULoxetine (CYMBALTA) 30 MG capsule    busPIRone (BUSPAR) 5 MG tablet    Depression, major, single episode, mild (H)    Relevant Medications    DULoxetine (CYMBALTA) 30 MG capsule    busPIRone (BUSPAR) 5 MG tablet      Will trial a switch from citalopram to duloxetine. No need to cross taper. Starting with 30mg dose with plan for close monitoring for safety and efficacy of medication. Will retrial buspar as well at this point. Patient also appears to have recently been started on a 7 day course of doxycycline which could be contributing to Neha's nausea.     32 minutes spent on the date of the encounter doing chart review, history and exam, documentation and further activities as noted.    Umesh Rocha MD  [facility]    Subjective   Neha is a 22 year old, presenting for the following health issues:  Recheck Medication (Check in on medication )    HPI   Anxiety and Depression  - noticing increased side effects on increased dose of Celexa (now 40mg daily)  - she feels like the celexa does help with depression symptoms, at both 20mg and 40mg dosing; but she is still struggling with anxiety  - feels like there is more nausea with citalopram at higher dose  - interested in discussing alternative antidepressant medications  - also wishes to discuss propranolol: not sure if it is helping      Review of Systems   Constitutional, HEENT, cardiovascular, pulmonary, gi and gu systems are negative, except as otherwise noted.      Objective    /83 (BP Location: Left arm, Patient Position: Sitting, Cuff Size: Adult Regular)   Pulse 89   Temp 98  F (36.7  C) (Skin)   Resp 15   Wt 59.9 kg (132 lb)   LMP 03/30/2023   SpO2 97%   BMI 21.09 kg/m    Body mass index is 21.09 kg/m .  Physical Exam   GENERAL: healthy, alert and no distress  NECK: no  adenopathy, no asymmetry, masses, or scars and thyroid normal to palpation  RESP: lungs clear to auscultation - no rales, rhonchi or wheezes  CV: regular rate and rhythm, normal S1 S2, no S3 or S4, no murmur, click or rub, no peripheral edema and peripheral pulses strong  ABDOMEN: soft, nontender, no hepatosplenomegaly, no masses and bowel sounds normal  MS: no gross musculoskeletal defects noted, no edema

## 2023-04-13 ENCOUNTER — OFFICE VISIT (OUTPATIENT)
Dept: FAMILY MEDICINE | Facility: CLINIC | Age: 23
End: 2023-04-13
Payer: COMMERCIAL

## 2023-04-13 VITALS
RESPIRATION RATE: 15 BRPM | BODY MASS INDEX: 21.09 KG/M2 | WEIGHT: 132 LBS | DIASTOLIC BLOOD PRESSURE: 83 MMHG | HEART RATE: 89 BPM | SYSTOLIC BLOOD PRESSURE: 126 MMHG | OXYGEN SATURATION: 97 % | TEMPERATURE: 98 F

## 2023-04-13 DIAGNOSIS — F41.1 GAD (GENERALIZED ANXIETY DISORDER): Primary | Chronic | ICD-10-CM

## 2023-04-13 DIAGNOSIS — F32.0 DEPRESSION, MAJOR, SINGLE EPISODE, MILD (H): ICD-10-CM

## 2023-04-13 RX ORDER — DULOXETIN HYDROCHLORIDE 30 MG/1
30 CAPSULE, DELAYED RELEASE ORAL DAILY
Qty: 45 CAPSULE | Refills: 0 | Status: SHIPPED | OUTPATIENT
Start: 2023-04-13 | End: 2023-09-12

## 2023-04-13 RX ORDER — BUSPIRONE HYDROCHLORIDE 5 MG/1
5 TABLET ORAL 3 TIMES DAILY PRN
Qty: 30 TABLET | Refills: 0 | Status: SHIPPED | OUTPATIENT
Start: 2023-04-13 | End: 2023-05-18 | Stop reason: DRUGHIGH

## 2023-04-13 NOTE — NURSING NOTE
22 year old  Chief Complaint   Patient presents with     Recheck Medication     Check in on medication        Blood pressure 126/83, pulse 89, temperature 98  F (36.7  C), temperature source Skin, resp. rate 15, weight 59.9 kg (132 lb), last menstrual period 03/30/2023, SpO2 97 %, not currently breastfeeding. Body mass index is 21.09 kg/m .  Patient Active Problem List   Diagnosis     Depression, major, single episode, mild (H)     BISI (generalized anxiety disorder)     Obsessive-compulsive disorder       Wt Readings from Last 2 Encounters:   04/13/23 59.9 kg (132 lb)   12/20/22 61.7 kg (136 lb)     BP Readings from Last 3 Encounters:   04/13/23 126/83   12/20/22 110/72   07/28/22 120/78         Current Outpatient Medications   Medication     citalopram (CELEXA) 40 MG tablet     doxycycline hyclate (VIBRA-TABS) 100 MG tablet     propranolol (INDERAL) 10 MG tablet     busPIRone (BUSPAR) 5 MG tablet     No current facility-administered medications for this visit.       Social History     Tobacco Use     Smoking status: Never     Smokeless tobacco: Never   Substance Use Topics     Alcohol use: Yes     Comment: 2x per week     Drug use: Never       Health Maintenance Due   Topic Date Due     CHLAMYDIA SCREENING  Never done     ADVANCE CARE PLANNING  Never done     DEPRESSION ACTION PLAN  Never done     HIV SCREENING  Never done     HEPATITIS C SCREENING  Never done     PAP  Never done     INFLUENZA VACCINE (1) 09/01/2022     DTAP/TDAP/TD IMMUNIZATION (3 - Td or Tdap) 12/10/2022     YEARLY PREVENTIVE VISIT  01/19/2023       No results found for: PAP      April 13, 2023 3:28 PM

## 2023-04-14 ASSESSMENT — ANXIETY QUESTIONNAIRES
7. FEELING AFRAID AS IF SOMETHING AWFUL MIGHT HAPPEN: NEARLY EVERY DAY
6. BECOMING EASILY ANNOYED OR IRRITABLE: MORE THAN HALF THE DAYS
3. WORRYING TOO MUCH ABOUT DIFFERENT THINGS: SEVERAL DAYS
IF YOU CHECKED OFF ANY PROBLEMS ON THIS QUESTIONNAIRE, HOW DIFFICULT HAVE THESE PROBLEMS MADE IT FOR YOU TO DO YOUR WORK, TAKE CARE OF THINGS AT HOME, OR GET ALONG WITH OTHER PEOPLE: VERY DIFFICULT
2. NOT BEING ABLE TO STOP OR CONTROL WORRYING: MORE THAN HALF THE DAYS
5. BEING SO RESTLESS THAT IT IS HARD TO SIT STILL: MORE THAN HALF THE DAYS
GAD7 TOTAL SCORE: 14
1. FEELING NERVOUS, ANXIOUS, OR ON EDGE: NEARLY EVERY DAY
GAD7 TOTAL SCORE: 14

## 2023-04-14 ASSESSMENT — PATIENT HEALTH QUESTIONNAIRE - PHQ9
SUM OF ALL RESPONSES TO PHQ QUESTIONS 1-9: 3
5. POOR APPETITE OR OVEREATING: SEVERAL DAYS

## 2023-05-18 ENCOUNTER — OFFICE VISIT (OUTPATIENT)
Dept: FAMILY MEDICINE | Facility: CLINIC | Age: 23
End: 2023-05-18
Payer: COMMERCIAL

## 2023-05-18 VITALS
WEIGHT: 133 LBS | BODY MASS INDEX: 21.25 KG/M2 | DIASTOLIC BLOOD PRESSURE: 75 MMHG | TEMPERATURE: 97.8 F | RESPIRATION RATE: 15 BRPM | OXYGEN SATURATION: 98 % | SYSTOLIC BLOOD PRESSURE: 104 MMHG | HEART RATE: 94 BPM

## 2023-05-18 DIAGNOSIS — F41.9 ANXIETY: ICD-10-CM

## 2023-05-18 DIAGNOSIS — F32.0 DEPRESSION, MAJOR, SINGLE EPISODE, MILD (H): Primary | ICD-10-CM

## 2023-05-18 RX ORDER — ALPRAZOLAM 0.25 MG
0.25 TABLET ORAL DAILY PRN
Qty: 10 TABLET | Refills: 0 | Status: SHIPPED | OUTPATIENT
Start: 2023-05-18 | End: 2023-06-21

## 2023-05-18 RX ORDER — CITALOPRAM HYDROBROMIDE 40 MG/1
40 TABLET ORAL DAILY
Qty: 90 TABLET | Refills: 1 | Status: SHIPPED | OUTPATIENT
Start: 2023-05-18 | End: 2023-11-10

## 2023-05-18 RX ORDER — PROPRANOLOL HYDROCHLORIDE 10 MG/1
10 TABLET ORAL 3 TIMES DAILY PRN
Qty: 90 TABLET | Refills: 1 | Status: SHIPPED | OUTPATIENT
Start: 2023-05-18 | End: 2024-04-05

## 2023-05-18 RX ORDER — BUSPIRONE HYDROCHLORIDE 10 MG/1
10 TABLET ORAL 3 TIMES DAILY
Qty: 90 TABLET | Refills: 1 | Status: SHIPPED | OUTPATIENT
Start: 2023-05-18 | End: 2024-03-25

## 2023-05-18 ASSESSMENT — PATIENT HEALTH QUESTIONNAIRE - PHQ9
10. IF YOU CHECKED OFF ANY PROBLEMS, HOW DIFFICULT HAVE THESE PROBLEMS MADE IT FOR YOU TO DO YOUR WORK, TAKE CARE OF THINGS AT HOME, OR GET ALONG WITH OTHER PEOPLE: SOMEWHAT DIFFICULT
SUM OF ALL RESPONSES TO PHQ QUESTIONS 1-9: 8
SUM OF ALL RESPONSES TO PHQ QUESTIONS 1-9: 8

## 2023-05-18 NOTE — NURSING NOTE
22 year old  Chief Complaint   Patient presents with     Recheck Medication     Meds         Blood pressure 104/75, pulse 94, temperature 97.8  F (36.6  C), temperature source Skin, resp. rate 15, weight 60.3 kg (133 lb), last menstrual period 05/11/2023, SpO2 98 %, not currently breastfeeding. Body mass index is 21.25 kg/m .  Patient Active Problem List   Diagnosis     Depression, major, single episode, mild (H)     BISI (generalized anxiety disorder)     Obsessive-compulsive disorder       Wt Readings from Last 2 Encounters:   05/18/23 60.3 kg (133 lb)   04/13/23 59.9 kg (132 lb)     BP Readings from Last 3 Encounters:   05/18/23 104/75   04/13/23 126/83   12/20/22 110/72         Current Outpatient Medications   Medication     busPIRone (BUSPAR) 5 MG tablet     DULoxetine (CYMBALTA) 30 MG capsule     propranolol (INDERAL) 10 MG tablet     No current facility-administered medications for this visit.       Social History     Tobacco Use     Smoking status: Never     Smokeless tobacco: Never   Substance Use Topics     Alcohol use: Yes     Comment: 2x per week     Drug use: Never       Health Maintenance Due   Topic Date Due     CHLAMYDIA SCREENING  Never done     ADVANCE CARE PLANNING  Never done     DEPRESSION ACTION PLAN  Never done     HIV SCREENING  Never done     HEPATITIS C SCREENING  Never done     PAP  Never done     INFLUENZA VACCINE (1) 09/01/2022     DTAP/TDAP/TD IMMUNIZATION (3 - Td or Tdap) 12/10/2022     YEARLY PREVENTIVE VISIT  01/19/2023       No results found for: PAP      May 18, 2023 12:59 PM

## 2023-05-18 NOTE — PROGRESS NOTES
Assessment & Plan   Problem List Items Addressed This Visit        Behavioral    Depression, major, single episode, mild (H) - Primary    Relevant Medications    busPIRone (BUSPAR) 10 MG tablet    citalopram (CELEXA) 40 MG tablet    ALPRAZolam (XANAX) 0.25 MG tablet   Other Visit Diagnoses     Anxiety        Relevant Medications    propranolol (INDERAL) 10 MG tablet    busPIRone (BUSPAR) 10 MG tablet    citalopram (CELEXA) 40 MG tablet    ALPRAZolam (XANAX) 0.25 MG tablet       Meds refilled as noted.     Agreed to a small Rx for Xanax to address panic attacks. Cautioned Neha on overuse of this medication. Advised her that I will not prescribe more that 10 tablets per month of this medication.     25 minutes spent on the date of the encounter doing chart review, history and exam, documentation and further activities as noted.    MD ONIEL [facility]    Subjective   Neha is a 22 year old, presenting for the following health issues:  Recheck Medication (Meds/)    HPI   Med follow up  Anxiety and Depression  - [facility] correspondence from 5/8/23:  I do think with my increased anxiety on cymbalta, it might be a good thing to go back on citalopram if I have been having increased anxiety. I still have anxiety/panic attacks on celexa but they are not at frequent as I have been experiencing as this. This is where something such as an emergency panic/anxiety option that I don t have to use alcohol to subside the panic might be a useful combination.   Today  - Neha would like to switch back to her citalopram as noted above  - she reports that with citalopram, in addition to her buspar and propranolol, she is able to manage for the most part  - she does report occasional instances when this med regimen is inadequate and she uses alcohol to calm herself as noted above        12/20/2022     8:35 AM 4/14/2023    10:19 AM 5/18/2023    11:50 AM   PHQ   PHQ-9 Total Score 4 3 8   Q9: Thoughts of  better off dead/self-harm past 2 weeks Not at all Not at all Not at all         6/10/2022     5:00 PM 12/20/2022     8:35 AM 4/14/2023    10:19 AM   BISI-7 SCORE   Total Score 14 15 14         Review of Systems   Constitutional, HEENT, cardiovascular, pulmonary, gi and gu systems are negative, except as otherwise noted.      Objective    /75 (BP Location: Right arm, Patient Position: Sitting, Cuff Size: Adult Regular)   Pulse 94   Temp 97.8  F (36.6  C) (Skin)   Resp 15   Wt 60.3 kg (133 lb)   LMP 05/11/2023   SpO2 98%   BMI 21.25 kg/m    Body mass index is 21.25 kg/m .  Physical Exam   GENERAL: healthy, alert and no distress  NECK: no adenopathy, no asymmetry, masses, or scars and thyroid normal to palpation  RESP: lungs clear to auscultation - no rales, rhonchi or wheezes  CV: regular rate and rhythm, normal S1 S2, no S3 or S4, no murmur, click or rub, no peripheral edema and peripheral pulses strong  ABDOMEN: soft, nontender, no hepatosplenomegaly, no masses and bowel sounds normal  MS: no gross musculoskeletal defects noted, no edema

## 2023-05-20 ENCOUNTER — HEALTH MAINTENANCE LETTER (OUTPATIENT)
Age: 23
End: 2023-05-20

## 2023-06-21 ENCOUNTER — MYC REFILL (OUTPATIENT)
Dept: FAMILY MEDICINE | Facility: CLINIC | Age: 23
End: 2023-06-21

## 2023-06-21 DIAGNOSIS — F41.9 ANXIETY: ICD-10-CM

## 2023-06-21 RX ORDER — ALPRAZOLAM 0.25 MG
0.25 TABLET ORAL DAILY PRN
Qty: 10 TABLET | Refills: 0 | Status: SHIPPED | OUTPATIENT
Start: 2023-06-21 | End: 2023-07-28

## 2023-06-21 NOTE — TELEPHONE ENCOUNTER
Alprazolam (Xanax) 0.25 mg    Last Office Visit: 5/18/23  [facility] [place]  Medication last refilled: 5/18/23 #10 with 0 refill(s)     verified - last fill date: 5/19/23 #10    PHQ-9 / BISI-7 Scores  12/20/22 4/13/23 5/18/23   BISI-7 Score DocFlow 15 14 --   PHQ-9 Score DocFlow 4 3 8     Routing refill request to provider for review/approval because:  Drug not on the [facility] refill protocol   Elevated PHQ and BISI scores    MEGHAN LozanoN, RN, CCM

## 2023-07-28 ENCOUNTER — MYC REFILL (OUTPATIENT)
Dept: FAMILY MEDICINE | Facility: CLINIC | Age: 23
End: 2023-07-28

## 2023-07-28 DIAGNOSIS — F41.9 ANXIETY: ICD-10-CM

## 2023-07-28 RX ORDER — ALPRAZOLAM 0.25 MG
0.25 TABLET ORAL DAILY PRN
Qty: 10 TABLET | Refills: 0 | Status: SHIPPED | OUTPATIENT
Start: 2023-07-28 | End: 2023-11-10

## 2023-07-28 NOTE — TELEPHONE ENCOUNTER
reviewed, no concerns. Med refilled as requested.     Neha was seen today for refill request.    Diagnoses and all orders for this visit:    Anxiety  -     ALPRAZolam (XANAX) 0.25 MG tablet; Take 1 tablet (0.25 mg) by mouth daily as needed for anxiety      Umesh Rocha MD  5:58 PM, July 28, 2023

## 2023-07-28 NOTE — TELEPHONE ENCOUNTER
Medication requested: ALPRAZolam (XANAX) 0.25 MG tablet   Last office visit: 5/18/23  [facility] appointments: none  Medication last refilled: 6/21/23; 10 + 0 refills, last sold 6/28/23 per   Last qualifying labs:    4/14/2023  10:19 AM   PHQ-9 / BISI-7 Scores 8/2015 to present    BISI-7 Score DocFlow 14      Routing refill request to provider for review/approval because:  Drug not on the [facility] refill protocol     Qamar REY, RN  07/28/23 5:09 PM

## 2023-08-01 ENCOUNTER — E-VISIT (OUTPATIENT)
Dept: URGENT CARE | Facility: CLINIC | Age: 23
End: 2023-08-01
Payer: COMMERCIAL

## 2023-08-01 DIAGNOSIS — N39.0 ACUTE UTI (URINARY TRACT INFECTION): Primary | ICD-10-CM

## 2023-08-01 PROCEDURE — 99207 PR NON-BILLABLE SERV PER CHARTING: CPT | Performed by: NURSE PRACTITIONER

## 2023-08-01 RX ORDER — NITROFURANTOIN 25; 75 MG/1; MG/1
100 CAPSULE ORAL 2 TIMES DAILY
Qty: 10 CAPSULE | Refills: 0 | Status: SHIPPED | OUTPATIENT
Start: 2023-08-01 | End: 2023-08-06

## 2023-08-01 NOTE — PATIENT INSTRUCTIONS
Dear Neha Farr    After reviewing your responses, I've been able to diagnose you with a urinary tract infection, which is a common infection of the bladder with bacteria.  This is not a sexually transmitted infection, though urinating immediately after intercourse can help prevent infections.  Drinking lots of fluids is also helpful to clear your current infection and prevent the next one.      I have sent a prescription for antibiotics to your pharmacy to treat this infection.    It is important that you take all of your prescribed medication even if your symptoms are improving after a few doses.  Taking all of your medicine helps prevent the symptoms from returning.     If your symptoms worsen, you develop pain in your back or stomach, develop fevers, or are not improving in 5 days, please contact your primary care provider for an appointment or visit any of our convenient Walk-in or Urgent Care Centers to be seen, which can be found on our website here.    Thanks again for choosing us as your health care partner,    JEAN-PIERRE Ayers CNP

## 2023-09-11 ENCOUNTER — E-VISIT (OUTPATIENT)
Dept: URGENT CARE | Facility: URGENT CARE | Age: 23
End: 2023-09-11
Payer: COMMERCIAL

## 2023-09-11 ENCOUNTER — MYC REFILL (OUTPATIENT)
Dept: FAMILY MEDICINE | Facility: CLINIC | Age: 23
End: 2023-09-11

## 2023-09-11 DIAGNOSIS — F41.9 ANXIETY: ICD-10-CM

## 2023-09-11 DIAGNOSIS — N39.0 ACUTE UTI (URINARY TRACT INFECTION): Primary | ICD-10-CM

## 2023-09-11 PROCEDURE — 99421 OL DIG E/M SVC 5-10 MIN: CPT | Performed by: PHYSICIAN ASSISTANT

## 2023-09-11 RX ORDER — ALPRAZOLAM 0.25 MG
0.25 TABLET ORAL DAILY PRN
Qty: 10 TABLET | Refills: 0 | Status: CANCELLED | OUTPATIENT
Start: 2023-09-11

## 2023-09-11 RX ORDER — NITROFURANTOIN 25; 75 MG/1; MG/1
100 CAPSULE ORAL 2 TIMES DAILY
Qty: 10 CAPSULE | Refills: 0 | Status: SHIPPED | OUTPATIENT
Start: 2023-09-11 | End: 2023-09-16

## 2023-09-12 NOTE — TELEPHONE ENCOUNTER
Alprazolam (Xanax) 0.25 mg    Last Office Visit: 5/18/23  [place] [facility] [place] None  Medication last refilled: 7/28/23 #10 with 0 refill(s)     verified - last fill date: 6/28/23 #10    Called pharmacy.  Per  has not used the July prescription sent to [facility].  Pharmacist confirmed they do have it on file.  Asked them to use prescription on file.    MEGHAN LozanoN, RN, CCM

## 2023-09-12 NOTE — PATIENT INSTRUCTIONS
Dear Neha Farr    After reviewing your responses, I've been able to diagnose you with a urinary tract infection, which is a common infection of the bladder with bacteria.  This is not a sexually transmitted infection, though urinating immediately after intercourse can help prevent infections.  Drinking lots of fluids is also helpful to clear your current infection and prevent the next one.      I have sent a prescription for antibiotics to your pharmacy to treat this infection.    It is important that you take all of your prescribed medication even if your symptoms are improving after a few doses.  Taking all of your medicine helps prevent the symptoms from returning.     If your symptoms worsen, you develop pain in your back or stomach, develop fevers, or are not improving in 5 days, please contact your primary care provider for an appointment or visit any of our convenient Walk-in or Urgent Care Centers to be seen, which can be found on our website here.    Thanks again for choosing us as your health care partner,    Devika Gutierrez PA-C

## 2023-11-10 ENCOUNTER — MYC REFILL (OUTPATIENT)
Dept: FAMILY MEDICINE | Facility: CLINIC | Age: 23
End: 2023-11-10

## 2023-11-10 DIAGNOSIS — F32.0 DEPRESSION, MAJOR, SINGLE EPISODE, MILD (H): ICD-10-CM

## 2023-11-10 DIAGNOSIS — F41.9 ANXIETY: ICD-10-CM

## 2023-11-13 RX ORDER — ALPRAZOLAM 0.25 MG
0.25 TABLET ORAL DAILY PRN
Qty: 10 TABLET | Refills: 0 | Status: SHIPPED | OUTPATIENT
Start: 2023-11-13 | End: 2024-03-06

## 2023-11-13 RX ORDER — CITALOPRAM HYDROBROMIDE 40 MG/1
40 TABLET ORAL DAILY
Qty: 90 TABLET | Refills: 0 | Status: SHIPPED | OUTPATIENT
Start: 2023-11-13 | End: 2024-02-19

## 2023-11-13 NOTE — TELEPHONE ENCOUNTER
Medication requested: citalopram (CELEXA) 40 MG tablet   Last office visit: 5/18/2023  [facility] appointments: none  Medication last refilled: 5/18/2023; 90 + 1 refill    Medication requested: ALPRAZolam (XANAX) 0.25 MG tablet   Medication last refilled: 7/28/2023; 10 + 0 refills; last sold #10 on 10/05/2023 per   Last qualifying labs:      5/18/2023  11:50 AM   PHQ-9 and GAD7 Scores    PHQ9 TOTAL SCORE 8 (Mild depression)    PHQ-9 Total Score 8      Routing refill request to provider for review/approval because:  Drug not on the G refill protocol     MC message sent to pt to schedule 6 month mental health visit.    CANDIDO Hanna, RN  11/13/23, 2:54 PM

## 2023-11-13 NOTE — TELEPHONE ENCOUNTER
reviewed, no concerns. Med refilled as requested.     Neha was seen today for refill request.    Diagnoses and all orders for this visit:    Anxiety  -     citalopram (CELEXA) 40 MG tablet; Take 1 tablet (40 mg) by mouth daily  -     ALPRAZolam (XANAX) 0.25 MG tablet; Take 1 tablet (0.25 mg) by mouth daily as needed for anxiety    Depression, major, single episode, mild (H24)  -     citalopram (CELEXA) 40 MG tablet; Take 1 tablet (40 mg) by mouth daily      Umesh Rocha MD  4:18 PM, November 13, 2023

## 2023-12-11 ENCOUNTER — OFFICE VISIT (OUTPATIENT)
Dept: FAMILY MEDICINE | Facility: CLINIC | Age: 23
End: 2023-12-11
Payer: COMMERCIAL

## 2023-12-11 ENCOUNTER — OFFICE VISIT (OUTPATIENT)
Dept: URGENT CARE | Facility: URGENT CARE | Age: 23
End: 2023-12-11
Payer: COMMERCIAL

## 2023-12-11 VITALS
TEMPERATURE: 97.6 F | HEART RATE: 83 BPM | OXYGEN SATURATION: 99 % | DIASTOLIC BLOOD PRESSURE: 80 MMHG | SYSTOLIC BLOOD PRESSURE: 124 MMHG

## 2023-12-11 VITALS
HEART RATE: 76 BPM | BODY MASS INDEX: 20.09 KG/M2 | HEIGHT: 66 IN | WEIGHT: 125 LBS | TEMPERATURE: 98.4 F | DIASTOLIC BLOOD PRESSURE: 70 MMHG | OXYGEN SATURATION: 98 % | SYSTOLIC BLOOD PRESSURE: 100 MMHG

## 2023-12-11 DIAGNOSIS — S61.411A LACERATION OF RIGHT HAND WITHOUT FOREIGN BODY, INITIAL ENCOUNTER: Primary | ICD-10-CM

## 2023-12-11 DIAGNOSIS — S61.411A LACERATION OF RIGHT HAND, FOREIGN BODY PRESENCE UNSPECIFIED, INITIAL ENCOUNTER: Primary | ICD-10-CM

## 2023-12-11 PROCEDURE — 12002 RPR S/N/AX/GEN/TRNK2.6-7.5CM: CPT | Performed by: PHYSICIAN ASSISTANT

## 2023-12-11 NOTE — PROGRESS NOTES
Assessment & Plan   Problem List Items Addressed This Visit    None  Visit Diagnoses       Laceration of right hand without foreign body, initial encounter    -  Primary           Deep puncture wound with continued bleeding. I was able to achieve temporary homeostasis with applied pressure, but given the depth of the wound and continued bleeding I am advising Neha to get to an UC or ED for further assessment before considering sutures. Tetanus UTD. No concerns for infection at this point.     22 minutes spent on the date of the encounter doing chart review, history and exam, documentation and further activities as noted.      MD ONIEL [facility]    Subjective   Neha is a 23 year old, presenting for the following health issues:  Laceration (Right hand cut, wants to see if needs stitches. Happened last night with a knife. )      HPI   Cut on hand  - was trying to puncture a hole with a knife while she was making a holiday garland, ended up with the knife going pretty deeply into the palp of her RIGHT hand  - this was last night  - since then, she has bleed through 3 or four bandages  - denies loss of sensation or strength in her fingers  - tetanus shot given last year  - wondering if she needs stitches        Review of Systems   Constitutional, HEENT, cardiovascular, pulmonary, gi and gu systems are negative, except as otherwise noted.      Objective    /80 (BP Location: Left arm, Patient Position: Sitting, Cuff Size: Adult Regular)   Pulse 83   Temp 97.6  F (36.4  C) (Skin)   SpO2 99%   There is no height or weight on file to calculate BMI.  Physical Exam   GENERAL: healthy, alert and no distress  NECK: no adenopathy, no asymmetry, masses, or scars and thyroid normal to palpation  RESP: lungs clear to auscultation - no rales, rhonchi or wheezes  CV: regular rate and rhythm, normal S1 S2, no S3 or S4, no murmur, click or rub, no peripheral edema and peripheral pulses  strong  ABDOMEN: soft, nontender, no hepatosplenomegaly, no masses and bowel sounds normal  MS: full movement and sensation of RIGHT hand, no evidence of cyanosis at fingertips; no gross musculoskeletal defects noted, no edema  SKIN: deep puncture wound in the center of palm of her RIGHT hand, still bleeds easily, no evidence of swelling or surrounding erythema at this point

## 2023-12-11 NOTE — NURSING NOTE
"23 year old  Chief Complaint   Patient presents with    Laceration     Right hand cut, wants to see if needs stitches. Happened last night with a knife.        Blood pressure 124/80, pulse 83, temperature 97.6  F (36.4  C), temperature source Skin, SpO2 99%, not currently breastfeeding. There is no height or weight on file to calculate BMI.  Patient Active Problem List   Diagnosis    Depression, major, single episode, mild (H24)    BISI (generalized anxiety disorder)    Obsessive-compulsive disorder       Wt Readings from Last 2 Encounters:   05/18/23 60.3 kg (133 lb)   04/13/23 59.9 kg (132 lb)     BP Readings from Last 3 Encounters:   12/11/23 124/80   05/18/23 104/75   04/13/23 126/83         Current Outpatient Medications   Medication    ALPRAZolam (XANAX) 0.25 MG tablet    busPIRone (BUSPAR) 10 MG tablet    citalopram (CELEXA) 40 MG tablet    propranolol (INDERAL) 10 MG tablet     No current facility-administered medications for this visit.       Social History     Tobacco Use    Smoking status: Never    Smokeless tobacco: Never   Substance Use Topics    Alcohol use: Yes     Comment: 2x per week    Drug use: Never       Health Maintenance Due   Topic Date Due    CHLAMYDIA SCREENING  Never done    ADVANCE CARE PLANNING  Never done    DEPRESSION ACTION PLAN  Never done    HIV SCREENING  Never done    HEPATITIS C SCREENING  Never done    PAP  Never done    DTAP/TDAP/TD IMMUNIZATION (3 - Td or Tdap) 12/10/2022    YEARLY PREVENTIVE VISIT  01/19/2023    INFLUENZA VACCINE (1) 09/01/2023    COVID-19 Vaccine (5 - 2023-24 season) 09/01/2023    PHQ-9  11/18/2023       No results found for: \"PAP\"      December 11, 2023 1:34 PM   "

## 2023-12-11 NOTE — PROGRESS NOTES
"SUBJECTIVE:     Chief Complaint   Patient presents with    Urgent Care    Laceration     Cut to right palm last night about midnight with sharp knife, won't stop bleeding. Tdap 2022     Neha Farr is a 23 year old female who presents to the clinic with a laceration on the right hand sustained 10 hour(s) ago.  This is a accidental injury.    Mechanism of injury: knife.    Associated symptoms: Denies loss of consciousness, vomiting or confusion.  Denies numbness, weakness, or loss of function  Last tetanus booster within 10 years: yes    EXAM:   The patient appears today in alert,no apparent distress distress  VITALS: /70   Pulse 76   Temp 98.4  F (36.9  C) (Temporal)   Ht 1.676 m (5' 6\")   Wt 56.7 kg (125 lb)   SpO2 98%   BMI 20.18 kg/m      Size of laceration: 3 centimeters  Characteristics of the laceration: bleeding- mild, clean, straight, and superficial  Tendon function intact: yes  Sensation to light touch intact: yes  Pulses intact: yes  Picture included in patient's chart: no    Assessment:  (S61.146A) Laceration of right hand, foreign body presence unspecified, initial encounter  (primary encounter diagnosis)  Plan: REPAIR SUPERFICIAL, WOUND BODY 2.6-7.5 CM    PLAN:  PROCEDURE NOTE::  Wound was locally injected with 2 cc's of Lidocaine 2% plain  Good anesthesia was obtained  Wound cleaned with HIBICLENS  Wound cleaned with betadine/saline solution  Wound irrigated  Laceration was closed using 3 4-0 ethilon interrupted sutures  After care instructions:  Keep wound clean and dry for the next 24-48 hours  Sutures out in 3 days  Signs of infection discussed today  Apply anti-bacterial ointment for 3 days  May return to work as long as wound is kept clean and dry  Discussed the probability of scarring      "

## 2024-02-19 ENCOUNTER — MYC REFILL (OUTPATIENT)
Dept: FAMILY MEDICINE | Facility: CLINIC | Age: 24
End: 2024-02-19

## 2024-02-19 DIAGNOSIS — F32.0 DEPRESSION, MAJOR, SINGLE EPISODE, MILD (H): ICD-10-CM

## 2024-02-19 DIAGNOSIS — F41.9 ANXIETY: ICD-10-CM

## 2024-02-20 RX ORDER — CITALOPRAM HYDROBROMIDE 40 MG/1
40 TABLET ORAL DAILY
Qty: 30 TABLET | Refills: 0 | Status: SHIPPED | OUTPATIENT
Start: 2024-02-20 | End: 2024-03-06

## 2024-02-20 NOTE — TELEPHONE ENCOUNTER
Medication requested: citalopram (CELEXA) 40 MG tablet   Last office visit: 12/11/23  [facility] appointments: 3/6/24  Medication last refilled: 11/13/23; 90 + 0 refills  Last qualifying labs:    4/14/2023  10:19 AM   PHQ-9 / BISI-7 Scores 8/2015 to present    BISI-7 Score DocFlow 14       5/18/2023  11:50 AM   PHQ-9 / BISI-7 Scores 8/2015 to present    PHQ-9 Score DocFlow 8      Medication is being filled for 1 time refill only due to:  Patient needs to be seen because due for mental health follow-up appt.    Qamar REY, RN  02/20/24 11:22 AM

## 2024-03-06 ENCOUNTER — OFFICE VISIT (OUTPATIENT)
Dept: BEHAVIORAL HEALTH | Facility: CLINIC | Age: 24
End: 2024-03-06

## 2024-03-06 ENCOUNTER — OFFICE VISIT (OUTPATIENT)
Dept: FAMILY MEDICINE | Facility: CLINIC | Age: 24
End: 2024-03-06
Payer: COMMERCIAL

## 2024-03-06 VITALS
OXYGEN SATURATION: 97 % | HEART RATE: 94 BPM | TEMPERATURE: 98.1 F | RESPIRATION RATE: 16 BRPM | SYSTOLIC BLOOD PRESSURE: 122 MMHG | DIASTOLIC BLOOD PRESSURE: 80 MMHG

## 2024-03-06 DIAGNOSIS — F43.20 ADJUSTMENT DISORDER, UNSPECIFIED TYPE: Primary | ICD-10-CM

## 2024-03-06 DIAGNOSIS — F41.9 ANXIETY: ICD-10-CM

## 2024-03-06 DIAGNOSIS — F42.9 OBSESSIVE-COMPULSIVE DISORDER, UNSPECIFIED TYPE: ICD-10-CM

## 2024-03-06 DIAGNOSIS — F32.0 DEPRESSION, MAJOR, SINGLE EPISODE, MILD (H): ICD-10-CM

## 2024-03-06 DIAGNOSIS — F32.4 MAJOR DEPRESSIVE DISORDER IN PARTIAL REMISSION, UNSPECIFIED WHETHER RECURRENT (H): Primary | ICD-10-CM

## 2024-03-06 DIAGNOSIS — Z86.59 HISTORY OF EATING DISORDER: ICD-10-CM

## 2024-03-06 RX ORDER — ALPRAZOLAM 0.25 MG
0.25 TABLET ORAL DAILY PRN
Qty: 10 TABLET | Refills: 0 | Status: SHIPPED | OUTPATIENT
Start: 2024-03-06 | End: 2024-03-25

## 2024-03-06 RX ORDER — CITALOPRAM HYDROBROMIDE 40 MG/1
40 TABLET ORAL DAILY
Qty: 90 TABLET | Refills: 3 | Status: SHIPPED | OUTPATIENT
Start: 2024-03-06

## 2024-03-06 ASSESSMENT — ANXIETY QUESTIONNAIRES
GAD7 TOTAL SCORE: 9
GAD7 TOTAL SCORE: 9
7. FEELING AFRAID AS IF SOMETHING AWFUL MIGHT HAPPEN: SEVERAL DAYS
4. TROUBLE RELAXING: NOT AT ALL
3. WORRYING TOO MUCH ABOUT DIFFERENT THINGS: SEVERAL DAYS
1. FEELING NERVOUS, ANXIOUS, OR ON EDGE: NEARLY EVERY DAY
5. BEING SO RESTLESS THAT IT IS HARD TO SIT STILL: NOT AT ALL
IF YOU CHECKED OFF ANY PROBLEMS ON THIS QUESTIONNAIRE, HOW DIFFICULT HAVE THESE PROBLEMS MADE IT FOR YOU TO DO YOUR WORK, TAKE CARE OF THINGS AT HOME, OR GET ALONG WITH OTHER PEOPLE: VERY DIFFICULT
6. BECOMING EASILY ANNOYED OR IRRITABLE: SEVERAL DAYS
GAD7 TOTAL SCORE: 9
7. FEELING AFRAID AS IF SOMETHING AWFUL MIGHT HAPPEN: SEVERAL DAYS
8. IF YOU CHECKED OFF ANY PROBLEMS, HOW DIFFICULT HAVE THESE MADE IT FOR YOU TO DO YOUR WORK, TAKE CARE OF THINGS AT HOME, OR GET ALONG WITH OTHER PEOPLE?: VERY DIFFICULT
2. NOT BEING ABLE TO STOP OR CONTROL WORRYING: NEARLY EVERY DAY

## 2024-03-06 ASSESSMENT — PATIENT HEALTH QUESTIONNAIRE - PHQ9
SUM OF ALL RESPONSES TO PHQ QUESTIONS 1-9: 9
10. IF YOU CHECKED OFF ANY PROBLEMS, HOW DIFFICULT HAVE THESE PROBLEMS MADE IT FOR YOU TO DO YOUR WORK, TAKE CARE OF THINGS AT HOME, OR GET ALONG WITH OTHER PEOPLE: VERY DIFFICULT
SUM OF ALL RESPONSES TO PHQ QUESTIONS 1-9: 9

## 2024-03-06 NOTE — NURSING NOTE
"23 year old  Chief Complaint   Patient presents with    Follow Up     Mental health -- doing well.       Blood pressure 122/80, pulse 94, temperature 98.1  F (36.7  C), temperature source Temporal, resp. rate 16, SpO2 97%, not currently breastfeeding. There is no height or weight on file to calculate BMI.  Patient Active Problem List   Diagnosis    Depression, major, single episode, mild (H24)    BISI (generalized anxiety disorder)    Obsessive-compulsive disorder       Wt Readings from Last 2 Encounters:   12/11/23 56.7 kg (125 lb)   05/18/23 60.3 kg (133 lb)     BP Readings from Last 3 Encounters:   03/06/24 122/80   12/11/23 100/70   12/11/23 124/80         Current Outpatient Medications   Medication    ALPRAZolam (XANAX) 0.25 MG tablet    busPIRone (BUSPAR) 10 MG tablet    citalopram (CELEXA) 40 MG tablet    propranolol (INDERAL) 10 MG tablet     No current facility-administered medications for this visit.       Social History     Tobacco Use    Smoking status: Never    Smokeless tobacco: Never   Substance Use Topics    Alcohol use: Yes     Comment: 2x per week    Drug use: Never       Health Maintenance Due   Topic Date Due    CHLAMYDIA SCREENING  Never done    ADVANCE CARE PLANNING  Never done    DEPRESSION ACTION PLAN  Never done    HIV SCREENING  Never done    HEPATITIS C SCREENING  Never done    PAP  Never done    DTAP/TDAP/TD IMMUNIZATION (3 - Td or Tdap) 12/10/2022    YEARLY PREVENTIVE VISIT  01/19/2023    INFLUENZA VACCINE (1) 09/01/2023    COVID-19 Vaccine (5 - 2023-24 season) 09/01/2023       No results found for: \"PAP\"      March 6, 2024 1:29 PM    "

## 2024-03-06 NOTE — PROGRESS NOTES
Assessment & Plan   Problem List Items Addressed This Visit          Behavioral    Depression, major, single episode, mild (H24)    Relevant Medications    ALPRAZolam (XANAX) 0.25 MG tablet    citalopram (CELEXA) 40 MG tablet    Obsessive-compulsive disorder    Relevant Medications    ALPRAZolam (XANAX) 0.25 MG tablet    citalopram (CELEXA) 40 MG tablet     Other Visit Diagnoses       Major depressive disorder in partial remission, unspecified whether recurrent (H24)    -  Primary    Relevant Medications    ALPRAZolam (XANAX) 0.25 MG tablet    citalopram (CELEXA) 40 MG tablet    Anxiety        Relevant Medications    ALPRAZolam (XANAX) 0.25 MG tablet    citalopram (CELEXA) 40 MG tablet          Meds refilled as requested. Warm hand off with [facility] today.      26 minutes spent on the date of the encounter doing chart review, history and exam, documentation and further activities as noted.    Umesh Rocha MD  1:49 PM, March 6, 2024        Juan Solomon is a 23 year old, presenting for the following health issues:  No chief complaint on file.    HPI   Mental Health Follow up  - anxiety, depression, OCD  - current meds    - Xanax 0.25 PRN (makes her very sleepy, but she likes to have it available)    - Buspar 10mg TID (not taking as it doesn't help)    - citalopram 40mg daily    - propranolol 10mg TID PRN  - interested in establishing with a therapist but not entirely sure how to do this        4/14/2023    10:19 AM 5/18/2023    11:50 AM 3/6/2024    12:01 PM   PHQ   PHQ-9 Total Score 3 8 9   Q9: Thoughts of better off dead/self-harm past 2 weeks Not at all Not at all Not at all         12/20/2022     8:35 AM 4/14/2023    10:19 AM 3/6/2024    12:01 PM   BISI-7 SCORE   Total Score   9 (mild anxiety)   Total Score 15 14 9           Review of Systems  Constitutional, HEENT, cardiovascular, pulmonary, gi and gu systems are negative, except as otherwise noted.      Objective    /80 (BP Location: Left arm, Patient  Position: Sitting, Cuff Size: Adult Regular)   Pulse 94   Temp 98.1  F (36.7  C) (Temporal)   Resp 16   SpO2 97%   There is no height or weight on file to calculate BMI.  Physical Exam   GENERAL: alert and no distress  NECK: no adenopathy, no asymmetry, masses, or scars  RESP: lungs clear to auscultation - no rales, rhonchi or wheezes  CV: regular rate and rhythm, normal S1 S2, no S3 or S4, no murmur, click or rub, no peripheral edema  ABDOMEN: soft, nontender, no hepatosplenomegaly, no masses and bowel sounds normal  MS: no gross musculoskeletal defects noted, no edema          Signed Electronically by: Umesh Rocha MD

## 2024-03-06 NOTE — PROGRESS NOTES
Patient had appointment with their primary care physician. [facility] services were offered. No immediate safety/risk issues were reported or identified.  Explained the role of the [facility], answered pt questions, provided contact information, and informed pt how to contact insurance plan for more information about  benefits.  Pt reported past eating disorder treatment.   Pt expressed interest in [facility] services and affirmed ability to schedule appt.

## 2024-03-12 ASSESSMENT — PATIENT HEALTH QUESTIONNAIRE - PHQ9
10. IF YOU CHECKED OFF ANY PROBLEMS, HOW DIFFICULT HAVE THESE PROBLEMS MADE IT FOR YOU TO DO YOUR WORK, TAKE CARE OF THINGS AT HOME, OR GET ALONG WITH OTHER PEOPLE: VERY DIFFICULT
SUM OF ALL RESPONSES TO PHQ QUESTIONS 1-9: 8
SUM OF ALL RESPONSES TO PHQ QUESTIONS 1-9: 8

## 2024-03-13 ENCOUNTER — OFFICE VISIT (OUTPATIENT)
Dept: BEHAVIORAL HEALTH | Facility: CLINIC | Age: 24
End: 2024-03-13
Payer: COMMERCIAL

## 2024-03-13 DIAGNOSIS — F41.0 PANIC DISORDER WITHOUT AGORAPHOBIA: ICD-10-CM

## 2024-03-13 DIAGNOSIS — F41.1 GAD (GENERALIZED ANXIETY DISORDER): Primary | ICD-10-CM

## 2024-03-13 ASSESSMENT — COLUMBIA-SUICIDE SEVERITY RATING SCALE - C-SSRS
TOTAL  NUMBER OF ABORTED OR SELF INTERRUPTED ATTEMPTS LIFETIME: NO
2. HAVE YOU ACTUALLY HAD ANY THOUGHTS OF KILLING YOURSELF?: NO
TOTAL  NUMBER OF INTERRUPTED ATTEMPTS LIFETIME: NO
ATTEMPT LIFETIME: NO
REASONS FOR IDEATION LIFETIME: COMPLETELY TO END OR STOP THE PAIN (YOU COULDN'T GO ON LIVING WITH THE PAIN OR HOW YOU WERE FEELING)
6. HAVE YOU EVER DONE ANYTHING, STARTED TO DO ANYTHING, OR PREPARED TO DO ANYTHING TO END YOUR LIFE?: NO
1. HAVE YOU WISHED YOU WERE DEAD OR WISHED YOU COULD GO TO SLEEP AND NOT WAKE UP?: YES
REASONS FOR IDEATION PAST MONTH: COMPLETELY TO END OR STOP THE PAIN (YOU COULDN'T GO ON LIVING WITH THE PAIN OR HOW YOU WERE FEELING)
1. IN THE PAST MONTH, HAVE YOU WISHED YOU WERE DEAD OR WISHED YOU COULD GO TO SLEEP AND NOT WAKE UP?: YES

## 2024-03-13 NOTE — PATIENT INSTRUCTIONS
"[facility]                                      Patient's Name: Neha Farr  March 13, 2024    COPING  PLAN:  Step 1: Warning signs / cues (Thoughts, images, mood, situation, behavior) that a crisis may be developing:  Thoughts: I wish I didn't have these feelings  Images: none  Thinking Processes: ruminations (can't stop thinking about my problems):   Mood: intense worry and panic  Behaviors: not attending social situations; avoidance ; alcohol use   Situations: social    Step 2: Coping strategies - Things I can do to take my mind off of my problems without contacting another person (relaxation technique, physical activity):  Distress Tolerance Strategies:  arts and crafts: put in air pods, watch a show  Physical Activities: distractions are helpful; sing along to songs  Focus on helpful thoughts:  just keep going   Step 3: People and social settings that provide distraction:   Name: Ashish  Phone: resides with Ashish   Name: Sister Phone: in phone   Name: Mother, Father, Step Mother Phone: in phone  Go to Sister's: resides in building    Step 4: Remind myself of people and things that are important to me and worth living for:   Mother, father, step mother, parnter; \"when I feel good I feel really good\", \"its not going to be like this forever\"  Step 5: When I am in crisis, I can ask these people to help me use my safety plan:   Name: Ashish  Phone: resides with Ashish   Name: Sister Phone: in phone   Name: Mother, Father, Step Mother Phone: in phone  Go to Sister's: resides in building    Step 6: Making the environment safe:   Being around supports  Step 7: Professionals or agencies I can contact during a crisis:  Suicide and Crisis Lifeline: 988  Crisis Text Line Service: Text   [place]  to 478777.  Local Crisis Services: [facility] [place]: [phone]    Call 911 or go to my nearest emergency department.   I helped develop this safety plan and agree to use it when needed.  I have been " given a copy of this plan.

## 2024-03-13 NOTE — PROGRESS NOTES
"    [facility]      PATIENT'S NAME: Neha Farr  PREFERRED NAME: Neha  PRONOUNS:   she/her    MRN: [id number]  : 2000  ADDRESS: [address] [place] [id number]  ACCT. NUMBER:  [id number]  DATE OF SERVICE: 3/13/24  START TIME: 10:01 am  END TIME: 11: 03 am  PREFERRED PHONE: [phone]  May we leave a program related message: Yes  EMERGENCY CONTACT: was not obtained  .  SERVICE MODALITY:  In-person    [place] ADULT Mental Health DIAGNOSTIC ASSESSMENT    Identifying Information:  Patient is a 23 year old,   individual.  Patient was referred for an assessment by selfself.  Patient attended the session alone.    Chief Complaint:   The reason for seeking services at this time is: \"Panic Disorder/Anxiety\".  The problem(s) began 18. Patient has attempted to resolve these concerns in the past through medication and therapy .    Neha stated, \"it started two years ago when driving back from my mom's cabin...started getting the feelings of anxiety and panic\".  Neha experienced panic symptoms while driving, and since then the anxiety and panic have worsened.  Neha reported experiencing anxiety daily and panic attacks semi-frequently.  Neha stated, \"If I'm having a really bad anxiety day...there's days I have to cancel (social events/activities)...its been happening more frequently, I'm scared of having another panic attack...it feels like I can't leave (home)\".  Neha reported the anxiety and worrying about having another panic attack is impacting \"social interations, driving, every day life\".   Neha is currently prescribed citalopram, propanolol, and alprazolam.     Neha stated, \"I've always had anxiety...I was an anxious kid\",  and met with PCP at age 11 because of \"OCD\" behaviors (ie checking behaviors).  Neha reported being prescribed psychotropic medication during her teens.  Neha was diagnosed with anorexia at [age] and attended six " "months of outpatient treatment program ([facility]).  Neha currently denied OCD and Eating disorder symptoms.      Neha reported experiencing passive SI approximately two weeks ago when panic symptoms were high, stating, \"Its so bad, its painful...Its so frustrating...I wish I didn't have these feelings\" and not living would give her symptom relief.  Neha denied current SI, intent or plan; denied past intent or plan; denied past/current SIB; denied past SA; and denied past psychiatric hospitalizations.  Neha was receptive to safety planning (see below) and education about and written with contact information for Crisis Resources.     Neha endorsed the following:  Anxiety: \"really heavy chest\", \"Pain in the chest\", \"Shoulder tension\", ruminating, worry, nervous, irritable, sleep issues    Panic Attack   - at times difficulty swallowing food/gaging/vomiting  -believes having a heart attack  -\"what if I lose control\"  -\"I get hot, trembly\"  -mind racing    Social/Family History:  Patient reported they grew up in [facility]  .  They were raised by biological mother; biological father; stepmother  .  Parents one or both remarried; Father remarried when Neha was 4 years old  Patient reported that their childhood was \"perfect\", \"really loved\".  Patient described their current relationships with family of origin as close; positive relationship with mother, father, step-mother, sister, and step brother    Sister: resides in the same building as Neha  Step brother: resides in [place]  Mother: resides in the area  Father and Step Mother: reside nearby for the part of the time and in [place] for part of the time     The patient describes their cultural background as ; Mother: Democrat, middle class, common sense, compassionate; Father and step mother: upper class, liberal, science and education.   Cultural influences and impact on patient's life structure, values, norms, and healthcare: None.  " Contextual influences on patient's health include: family history of anixety.  These factors will be addressed in the Preliminary Treatment plan. Patient identified their preferred language to be English. Patient reported they does not need the assistance of an  or other support involved in therapy.     Patient reported had no significant delays in developmental tasks.   Patient's highest education level was graduate school  .  Patient identified the following learning problems: none reported.  Modifications will not be used to assist communication in therapy.  Patient reports they are  able to understand written materials.    Patient reported the following relationship history : no marriages/no divorces.  Patient's current relationship status is has a partner or significant other for 6 years .   Patient identified their sexual orientation as heterosexual.  Patient reported having 0  child(kaylen). Patient identified partner; parents; mother; father; siblings; pets; friends as part of their support system.  Patient identified the quality of these relationships as stable and meaningful,  .      Patient's current living/housing situation involves staying in own home/apartment.  The immediate members of family and household include Ashish HollowayBridger vidal,Significant Other  and they report that housing is stable.    Patient is currently unemployed.  Patient reports their finances are obtained through partner. Patient does not identify finances as a current stressor.      Patient reported that they have not been involved with the legal system.    . Patient does not report being under probation/ parole/ jurisdiction. They are not under any current court jurisdiction. .    Patient's Strengths and Limitations:  Patient identified the following strengths or resources that will help them succeed in treatment: friends / good social support, family support, insight, intelligence, and motivation. Things that may interfere  with the patient's success in treatment include: none identified.     Assessments:  The following assessments were completed by patient for this visit:  PHQ9:       1/19/2022     8:39 AM 6/10/2022     5:00 PM 12/20/2022     8:35 AM 4/14/2023    10:19 AM 5/18/2023    11:50 AM 3/6/2024    12:01 PM 3/12/2024    11:12 PM   PHQ-9 SCORE   PHQ-9 Total Score MyChart     8 (Mild depression) 9 (Mild depression) 8 (Mild depression)   PHQ-9 Total Score 3 8 4 3 8 9 8     GAD7:       1/19/2022     8:39 AM 6/10/2022     5:00 PM 12/20/2022     8:35 AM 4/14/2023    10:19 AM 3/6/2024    12:01 PM   BISI-7 SCORE   Total Score     9 (mild anxiety)   Total Score 6 14 15 14 9     CAGE-AID:       3/12/2024    11:26 PM   CAGE-AID Total Score   Total Score 4   Total Score MyChart 4 (A total score of 2 or greater is considered clinically significant)     PROMIS 10-Global Health (only subscores and total score):       3/12/2024    11:26 PM   PROMIS-10 Scores Only   Global Mental Health Score 12   Global Physical Health Score 15   PROMIS TOTAL - SUBSCORES 27       Personal and Family Medical History:  Patient does report a family history of mental health concerns; mother and father.  Patient reports family history includes No Known Problems in her father, mother, and sister..     Patient does report Mental Health Diagnosis and/or Treatment.  Patient reported the following previous diagnoses which include(s): an anxiety disorder; depression; an eating disorder; obsessive compulsive disorder .  Patient reported symptoms began during teens.  Patient has received mental health services in the past:  therapy  .  Psychiatric Hospitalizations: none   Patient denies a history of civil commitment.      Currently, patient none  is receiving other mental health services.  These include none.       Patient has had a physical exam to rule out medical causes for current symptoms.  Date of last physical exam was within the past year. Client was encouraged to  follow up with PCP if symptoms were to develop. The patient has a [facility] Primary Care Provider, who is named Cristiano Diaz.  Patient reports no current medical concerns.  Patient denies any issues with pain..   There are not significant appetite / nutritional concerns / weight changes.   Patient does not report a history of head injury / trauma / cognitive impairment.      Patient reports current meds as:   Outpatient Medications Marked as Taking for the 3/13/24 encounter (Office Visit) with Ullrich, Shawn G, LICSW   Medication Sig    ALPRAZolam (XANAX) 0.25 MG tablet Take 1 tablet (0.25 mg) by mouth daily as needed for anxiety    citalopram (CELEXA) 40 MG tablet Take 1 tablet (40 mg) by mouth daily    propranolol (INDERAL) 10 MG tablet Take 1 tablet (10 mg) by mouth 3 times daily as needed (Anxiety)     Citalopram: taking daily  Buspirone: hasn't taken for months  Propanolol: 1-2x's per week  Xanax: once every 2 weeks      Medication Adherence:  Patient reports taking.      Patient Allergies:  No Known Allergies    Medical History:    Past Medical History:   Diagnosis Date    Depression, major, single episode, mild (H24) 3/13/2018    Moderate anxiety 3/13/2018    Obsessive-compulsive disorder 3/13/2018         Current Mental Status Exam:   Appearance:  Appropriate    Eye Contact:  Good   Psychomotor:  Normal       Gait / station:  no problem  Attitude / Demeanor: Cooperative   Speech      Rate / Production: Normal/ Responsive      Volume:  Normal  volume      Language:  intact  Mood:   Anxious   Affect:   Worrisome    Thought Content: Clear   Thought Process: Coherent       Associations: No loosening of associations  Insight:   Good   Judgment:  Intact   Orientation:  All  Attention/concentration: Fair    Substance Use:   Patient did not report a family history of substance use concerns; see medical history section for details.  Patient has not received chemical dependency treatment in the past.  Patient  has not ever been to detox.      Patient is not currently receiving any chemical dependency treatment.     Alcohol: every other day; 1 bottle of wine  Cannabis: none  Caffeine: no coffee, diet coke daily  Nicotine: Vape daily         Substance History of use Age of first use Date of last use     Pattern and duration of use (include amounts and frequency)   Alcohol currently use   16 03/11/24 See above   Cannabis   used in the past 17 10/01/19 REPORTS SUBSTANCE USE: N/A     Amphetamines   never used     REPORTS SUBSTANCE USE: N/A   Cocaine/crack    never used       REPORTS SUBSTANCE USE: N/A   Hallucinogens never used         REPORTS SUBSTANCE USE: N/A   Inhalants never used         REPORTS SUBSTANCE USE: N/A   Heroin never used         REPORTS SUBSTANCE USE: N/A   Other Opiates never used     REPORTS SUBSTANCE USE: N/A   Benzodiazepine   currently use 23 03/10/24 prescribed   Barbiturates never used     REPORTS SUBSTANCE USE: N/A   Over the counter meds currently use Baby? 12/01/22 REPORTS SUBSTANCE USE: N/A   Caffeine currently use 12   Diet Coke Daily   Nicotine  currently use 16 03/13/24 Vape daily   Other substances not listed above:  Identify:  never used     REPORTS SUBSTANCE USE: N/A     Patient reported the following problems as a result of their substance use: relationship problems.    Substance Use:  using alcohol to manage mental health symptoms    Based on the positive CAGE score and clinical interview there  are indications of drug or alcohol abuse. Diagnostic assessment for substance use disorder completed. Therapist did recommend client to reduce use or abstain from alcohol or substance use. Therapist did not recommend structured treatment and or community support (AA, 12 step group, etc.). Pt referred to IOP for Anxiety, and is receptive to stopping alcohol while addressing Anxiety; will continue to monitor and if patient is unable to reduce alcohol use, will adjust recommendations  .    Significant  Losses / Trauma / Abuse / Neglect Issues:   Patient did not  serve in the .  There are indications or report of significant loss, trauma, abuse or neglect issues related to:  Neha reports the Panic Attacks have gotten to the point where she believes they are traumatizing .  Concerns for possible neglect are not present.       Safety Assessment:   Patient denies current homicidal ideation and behaviors.  Patient denies current self-injurious ideation and behaviors.    Patient denied risk behaviors associated with substance use.   Patient denies any high risk behaviors associated with mental health symptoms.  Patient reports the following current concerns for their personal safety: None.  Patient reports there are not firearms in the house.           History of Safety Concerns:  Patient denied a history of homicidal ideation.     Patient denied a history of personal safety concerns.    Patient denied a history of assaultive behaviors.    Patient denied a history of sexual assault behaviors.     Patient denied a history of risk behaviors associated with substance use.  Patient denies any history of high risk behaviors associated with mental health symptoms.  Patient reports the following protective factors: forward or future oriented thinking; dedication to family or friends; safe and stable environment; living with other people; positive social skills; healthy fear of risky behaviors or pain    Risk Plan:  See Recommendations for Safety and Risk Management Plan    Review of Symptoms per patient report:   Depression: Change in sleep, Lack of interest, Change in energy level, Difficulties concentrating, Ruminations, Irritability, and Feeling sad, down, or depressed  Summer:  No Symptoms  Psychosis: No Symptoms  Anxiety: Excessive worry, Nervousness, Physical complaints, such as headaches, stomachaches, muscle tension, Sleep disturbance, Ruminations, Poor concentration, and Irritability  Panic:  Palpitations,  Tremors, Shortness of breath, Numbness, Sense of impending doom, and Hot or cold flashes  Post Traumatic Stress Disorder:  Experienced traumatic event see above    Eating Disorder: None current  ADD / ADHD:  No symptoms  Conduct Disorder: No symptoms  Autism Spectrum Disorder: No symptoms  Obsessive Compulsive Disorder: No Symptoms    Patient reports the following compulsive behaviors and treatment history:  Nonecurrent .      Diagnostic Criteria:   Generalized Anxiety Disorder  A. Excessive anxiety and worry about a number of events or activities (such as work or school performance).   B. The person finds it difficult to control the worry.  C. Select 3 or more symptoms (required for diagnosis). Only one item is required in children.   - Being easily fatigued.    - Difficulty concentrating or mind going blank.    - Irritability.    - Muscle tension.    - Sleep disturbance (difficulty falling or staying asleep, or restless unsatisfying sleep).   D. The focus of the anxiety and worry is not confined to features of an Axis I disorder.  E. The anxiety, worry, or physical symptoms cause clinically significant distress or impairment in social, occupational, or other important areas of functioning.   F. The disturbance is not due to the direct physiological effects of a substance (e.g., a drug of abuse, a medication) or a general medical condition (e.g., hyperthyroidism) and does not occur exclusively during a Mood Disorder, a Psychotic Disorder, or a Pervasive Developmental Disorder.    - The aformentioned symptoms began 5 year(s) ago and occurs 7 days per week and is experienced as moderate.  Panic Disorder, A.Recurrent unexpected panic attacks. A panic attack is an abrupt surge of intense fear or intense discomfort that reaches a peak within minutes, and during which time four (or more) of the following symptoms occur: Chest pain , Chill / hot flashes, Feeling dizzy, unsteady, light-headed, or faint, Fear of losing  "control or \"going crazy\", Feeling of choking, Increased heart rate/ Palpitations, Shortness of breath, Sweating, and Trembling / shaking, B.At least one of the attacks has been followed by 1 month (or more) of Persistent concern or worry about additional panic attacks or their consequences and A significant maladaptive change in behavior related to the attacks (behaviors designed to avoid having panic attacks, such as avoidance or exercise or unfamiliar situations), C.The disturbance is not attributable to the physiological effects of a substance (e.g., a drug of abuse, a medication) or another medical condition (e.g., hyperthyroidism, cardiopulmonary disorders)., and D.The disturbance is not better explained by another mental disorder    Functional Status:  Patient reports the following functional impairments:  home life with partner, management of the household and or completion of tasks, operation of a motor vehicle, relationship(s), social interactions, and work / vocational responsibilities.     Nonprogrammatic care:  Patient is requesting basic services to address current mental health concerns.    Clinical Summary:  1. Psychosocial, Cultural and Contextual Factors: , heterosexual, partnered female; unemployed  .  2. Principal DSM5 Diagnoses  (Sustained by DSM5 Criteria Listed Above):   300.01 (F41.0) Panic Disorder  300.02 (F41.1) Generalized Anxiety Disorder.  3. Other Diagnoses that is relevant to services:   NA  4. Provisional Diagnosis:  History of Anorexia and OCD  5. Prognosis: Expect Improvement, Relieve Acute Symptoms, and Maintain Current Status / Prevent Deterioration.  6. Likely consequences of symptoms if not treated: Continued/worsening symptoms and increased/prolonged functional impairment.  7. Client strengths include:  educated, goal-focused, has a previous history of therapy, insightful, intelligent, motivated, open to learning, support of family, friends and providers, wants to " "learn, and willing to ask questions .     Recommendations:     1. Plan for Safety and Risk Management:   Safety and Risk: A safety and risk management plan has been developed including: Patient consented to co-developed safety plan.  Safety and risk management plan was completed - see below.  Patient agreed to use safety plan should any safety concerns arise.  A copy was given to the patient..   Also educated Neha about and provided written contact information for Crisis Resources   Report to child / adult protection services was NA.     2. Patient's identified mental health concerns with a cultural influence will be addressed by engaging in both medication and non-medication interventions .     3. Initial Treatment will focus on:    Anxiety - worry, and panic .     4. Resources/Service Plan:    services are not indicated.   Modifications to assist communication are not indicated.   Additional disability accommodations are not indicated.      5. Collaboration:   Collaboration / coordination of treatment will be initiated with the following  support professionals: primary care physician.      6.  Referrals:   The following referral(s) will be initiated: Outpatient [facility]  [facility] .       A Release of Information has been obtained for the following:  [facility] .     Clinical Substantiation/medical necessity for the above recommendations:        Neha stated, \"it started two years ago when driving back from my mom's cabin...started getting the feelings of anxiety and panic\".  Neha experienced panic symptoms while driving, and since then the anxiety and panic have worsened.  Neha reported experiencing anxiety daily and panic attacks semi-frequently.  Neha stated, \"If I'm having a really bad anxiety day...there's days I have to cancel (social events/activities)...its been happening more frequently, I'm scared of having another panic attack...it feels like I can't leave (home)\".  " "Neha reported the anxiety and worrying about having another panic attack is impacting \"social interations, driving, every day life\".   Neha is currently prescribed citalopram, propanolol, and alprazolam. Neha stated, \"I've always had anxiety...I was an anxious kid\",  and met with PCP at age 11 because of \"OCD\" behaviors (ie checking behaviors).  Neha reported being prescribed psychotropic medication during her teens.  Neha was diagnosed with anorexia at [age] and attended six months of outpatient treatment program ([facility]).  Neha currently denied OCD and Eating disorder symptoms.  Neha reported experiencing passive SI approximately two weeks ago when panic symptoms were high, stating, \"Its so bad, its painful...Its so frustrating...I wish I didn't have these feelings\" and not living would give her symptom relief.  Neha denied current SI, intent or plan; denied past intent or plan; denied past/current SIB; denied past SA; and denied past psychiatric hospitalizations.      Neha meets criteria for BISI and Panic Disorder, and symptoms are significantly impacting functioning, therefore recommending Intensive Outpatient Patient Anxiety specific treatment ([facility]), Psychiatric medication evaluation ([facility]), Individual psychotherapy services, and abstain from alcohol and substances.  Neha was receptive to recommendations; will refer to [facility] and individual psychotherapy services; Neha will contact [facility] to schedule intake appt.     7. ADOLFO:    ADOLFO:  Discussed the general effects of drugs and alcohol on health and well-being, specifically alcohol and anxiety, and use of benzodiazepines with alcohol to manage anxiety.  Recommendations:  Abstain from alcohol and substances.     8. Records:   These were reviewed at time of assessment.   Information in this assessment was obtained from the medical record and  provided by patient who is a good historian.    Patient will have " "open access to their mental health medical record.    9.   Interactive Complexity: No    10. Safety Plan:  When the patient identifies the following:  Suicidal Ideation Without method, intent, plan, or behavior (Yes to C-SSRS Suicidal Ideation #1 or #2 and No to #3,4,5)    The following is recommended:   Complete/Review/Update Safety Plan    Safety Plan:      [facility]                                      Patient's Name: Neha Farr  March 13, 2024    COPING  PLAN:  Step 1: Warning signs / cues (Thoughts, images, mood, situation, behavior) that a crisis may be developing:  Thoughts:  I wish I didn't have these feelings  Images: none  Thinking Processes: ruminations (can't stop thinking about my problems):    Mood: intense worry and panic  Behaviors:  not attending social situations; avoidance  ; alcohol use   Situations:  social     Step 2: Coping strategies - Things I can do to take my mind off of my problems without contacting another person (relaxation technique, physical activity):  Distress Tolerance Strategies:  arts and crafts: put in air pods, watch a show  Physical Activities:  distractions are helpful ; sing along to songs  Focus on helpful thoughts:   just keep going   Step 3: People and social settings that provide distraction:   Name: Ashish  Phone: resides with Ashish   Name: Sister Phone: in phone   Name: Mother, Father, Step Mother Phone: in phone  Go to Sister's: resides in building     Step 4: Remind myself of people and things that are important to me and worth living for:   Mother, father, step mother, parnter; \"when I feel good I feel really good\", \"its not going to be like this forever\"  Step 5: When I am in crisis, I can ask these people to help me use my safety plan:   Name: Ashish  Phone: resides with Ashish   Name: Sister Phone: in phone   Name: Mother, Father, Step Mother Phone: in phone  Go to Sister's: resides in building    Step 6: Making the environment safe: "   Being around supports  Step 7: Professionals or agencies I can contact during a crisis:  Suicide and Crisis Lifeline: 988  Crisis Text Line Service: Text   [place]  to 838104.  Local Crisis Services: [facility] [place]: [phone]    Call 911 or go to my nearest emergency department.   I helped develop this safety plan and agree to use it when needed.  I have been given a copy of this plan.      Patient signature: _______________________________________________________________  Today s date:  March 13, 2024  Adapted from Safety Plan Template 2008 Francie Chaves and Declan Adkins is reprinted with the express permission of the authors.  No portion of the Safety Plan Template may be reproduced without the express, written permission.  You can contact the authors at [email or web address] or [email or web address].      Provider Name/ Credentials:  Shawn Ullrich LIC, [facility]  March 13, 2024

## 2024-03-25 ENCOUNTER — MYC REFILL (OUTPATIENT)
Dept: FAMILY MEDICINE | Facility: CLINIC | Age: 24
End: 2024-03-25

## 2024-03-25 ENCOUNTER — TELEPHONE (OUTPATIENT)
Dept: FAMILY MEDICINE | Facility: CLINIC | Age: 24
End: 2024-03-25

## 2024-03-25 DIAGNOSIS — F41.9 ANXIETY: ICD-10-CM

## 2024-03-25 RX ORDER — ALPRAZOLAM 0.25 MG
0.25 TABLET ORAL DAILY PRN
Qty: 10 TABLET | Refills: 0 | Status: CANCELLED | OUTPATIENT
Start: 2024-03-25

## 2024-03-25 NOTE — TELEPHONE ENCOUNTER
Who is calling? Patient  Reason for Call:Reached out to [facility] Behavioral Access Team, they did not see a referral for a psychiatric medication evaluation.

## 2024-03-25 NOTE — TELEPHONE ENCOUNTER
Alprazolam (Xanax) 0.25 mg    Last Office Visit: 3/6/24  [facility] [place] Appointments: None  Medication last refilled: 3/6/24 #10 with 0 refill(s)     verified - last fill date: 3/23/24 #10    PHQ-9 / BISI-7 Scores  6/10/22 5/18/23 3/21/24   BISI-7 Score DocFlow 14 9 8     Routing refill request to provider for review/approval because:  Drug not on the [facility] refill protocol     Patient requesting second refill for upcoming trip so she has enough to get through the trip.  This will be in addition to the #10 she just picked up on 3/23/24.    Daylin Morales, MEGHANN, RN, CCM

## 2024-03-26 DIAGNOSIS — F41.9 ANXIETY: Primary | ICD-10-CM

## 2024-03-26 RX ORDER — ALPRAZOLAM 0.25 MG
0.25 TABLET ORAL DAILY PRN
Qty: 10 TABLET | Refills: 0 | Status: SHIPPED | OUTPATIENT
Start: 2024-03-26 | End: 2024-08-06

## 2024-03-26 NOTE — PROGRESS NOTES
Patient discussed with S. Referral placed as noted below.    Diagnoses and all orders for this visit:    Anxiety  -     Adult Mental Health  Referral; Future      Umesh Rocha MD  7:51 AM, March 26, 2024

## 2024-03-26 NOTE — TELEPHONE ENCOUNTER
Second refill provided for anxiety with upcoming travel. Will need to monitor closely for increase frequency of use beyond this.    Neha was seen today for refill request.    Diagnoses and all orders for this visit:    Anxiety  -     ALPRAZolam (XANAX) 0.25 MG tablet; Take 1 tablet (0.25 mg) by mouth daily as needed for anxiety      Umesh Rocha MD  10:06 AM, March 26, 2024

## 2024-04-05 DIAGNOSIS — F41.9 ANXIETY: ICD-10-CM

## 2024-04-05 RX ORDER — PROPRANOLOL HYDROCHLORIDE 10 MG/1
10 TABLET ORAL 3 TIMES DAILY PRN
Qty: 90 TABLET | Refills: 1 | Status: SHIPPED | OUTPATIENT
Start: 2024-04-05 | End: 2024-07-10

## 2024-04-05 NOTE — TELEPHONE ENCOUNTER
Propranolol (Inderal) 10 mg    Last Office Visit: 3/6/24  [facility] [place]: None  Medication last refilled: 5/18/23 #90 with 1 refill(s)    Vital Signs Systolic Diastolic   3/6/24 122 80   4/13/23 126 83   1/19/22 125 80     PHQ-9 / BISI-7 Scores  12/20/22 4/14/23 3/21/24   BISI-7 Score DocFlow 14 14 8   PHQ-9 Score DocFlow 8 3 7     Prescription approved per [facility] Refill Protocol.    Daylin Morales, BSN, RN, CCM

## 2024-07-10 ENCOUNTER — MYC REFILL (OUTPATIENT)
Dept: FAMILY MEDICINE | Facility: CLINIC | Age: 24
End: 2024-07-10

## 2024-07-10 DIAGNOSIS — F41.9 ANXIETY: ICD-10-CM

## 2024-07-10 SDOH — HEALTH STABILITY: PHYSICAL HEALTH: ON AVERAGE, HOW MANY MINUTES DO YOU ENGAGE IN EXERCISE AT THIS LEVEL?: 20 MIN

## 2024-07-10 SDOH — HEALTH STABILITY: PHYSICAL HEALTH: ON AVERAGE, HOW MANY DAYS PER WEEK DO YOU ENGAGE IN MODERATE TO STRENUOUS EXERCISE (LIKE A BRISK WALK)?: 3 DAYS

## 2024-07-10 ASSESSMENT — SOCIAL DETERMINANTS OF HEALTH (SDOH): HOW OFTEN DO YOU GET TOGETHER WITH FRIENDS OR RELATIVES?: TWICE A WEEK

## 2024-07-11 RX ORDER — PROPRANOLOL HYDROCHLORIDE 10 MG/1
10 TABLET ORAL 3 TIMES DAILY PRN
Qty: 90 TABLET | Refills: 1 | Status: SHIPPED | OUTPATIENT
Start: 2024-07-11 | End: 2024-09-17

## 2024-07-11 NOTE — TELEPHONE ENCOUNTER
Medication requested: propranolol (INDERAL) 10 MG tablet   Last office visit: 3/6/24  [facility] appointments: 7/17/24  Medication last refilled: 4/5/24; 90 + 1 refills  Last qualifying labs:   BP Readings from Last 3 Encounters:   03/06/24 122/80   12/11/23 100/70   12/11/23 124/80     Prescription approved per [facility] Refill Protocol.    Qamar REY, RN  07/11/24 2:20 PM

## 2024-07-17 ENCOUNTER — OFFICE VISIT (OUTPATIENT)
Dept: FAMILY MEDICINE | Facility: CLINIC | Age: 24
End: 2024-07-17
Payer: COMMERCIAL

## 2024-07-17 VITALS
WEIGHT: 137 LBS | RESPIRATION RATE: 14 BRPM | OXYGEN SATURATION: 100 % | BODY MASS INDEX: 22.02 KG/M2 | TEMPERATURE: 98.2 F | HEIGHT: 66 IN | DIASTOLIC BLOOD PRESSURE: 62 MMHG | SYSTOLIC BLOOD PRESSURE: 98 MMHG | HEART RATE: 64 BPM

## 2024-07-17 DIAGNOSIS — Z23 NEED FOR TETANUS, DIPHTHERIA, AND ACELLULAR PERTUSSIS (TDAP) VACCINE: Primary | ICD-10-CM

## 2024-07-17 DIAGNOSIS — Z13.220 LIPID SCREENING: ICD-10-CM

## 2024-07-17 DIAGNOSIS — Z12.4 SCREENING FOR MALIGNANT NEOPLASM OF CERVIX: ICD-10-CM

## 2024-07-17 DIAGNOSIS — Z13.228 SCREENING FOR METABOLIC DISORDER: ICD-10-CM

## 2024-07-17 DIAGNOSIS — Z11.4 SCREENING FOR HIV (HUMAN IMMUNODEFICIENCY VIRUS): ICD-10-CM

## 2024-07-17 DIAGNOSIS — Z11.3 SCREEN FOR STD (SEXUALLY TRANSMITTED DISEASE): ICD-10-CM

## 2024-07-17 DIAGNOSIS — Z11.59 ENCOUNTER FOR HCV SCREENING TEST FOR LOW RISK PATIENT: ICD-10-CM

## 2024-07-17 PROCEDURE — 87591 N.GONORRHOEAE DNA AMP PROB: CPT | Performed by: FAMILY MEDICINE

## 2024-07-17 PROCEDURE — 87491 CHLMYD TRACH DNA AMP PROBE: CPT | Performed by: FAMILY MEDICINE

## 2024-07-17 PROCEDURE — 80061 LIPID PANEL: CPT | Performed by: FAMILY MEDICINE

## 2024-07-17 PROCEDURE — 87389 HIV-1 AG W/HIV-1&-2 AB AG IA: CPT | Performed by: FAMILY MEDICINE

## 2024-07-17 PROCEDURE — 80048 BASIC METABOLIC PNL TOTAL CA: CPT | Performed by: FAMILY MEDICINE

## 2024-07-17 PROCEDURE — G0145 SCR C/V CYTO,THINLAYER,RESCR: HCPCS | Performed by: FAMILY MEDICINE

## 2024-07-17 PROCEDURE — 86803 HEPATITIS C AB TEST: CPT | Performed by: FAMILY MEDICINE

## 2024-07-17 ASSESSMENT — ANXIETY QUESTIONNAIRES
7. FEELING AFRAID AS IF SOMETHING AWFUL MIGHT HAPPEN: NOT AT ALL
5. BEING SO RESTLESS THAT IT IS HARD TO SIT STILL: NOT AT ALL
8. IF YOU CHECKED OFF ANY PROBLEMS, HOW DIFFICULT HAVE THESE MADE IT FOR YOU TO DO YOUR WORK, TAKE CARE OF THINGS AT HOME, OR GET ALONG WITH OTHER PEOPLE?: SOMEWHAT DIFFICULT
3. WORRYING TOO MUCH ABOUT DIFFERENT THINGS: SEVERAL DAYS
4. TROUBLE RELAXING: NOT AT ALL
7. FEELING AFRAID AS IF SOMETHING AWFUL MIGHT HAPPEN: NOT AT ALL
GAD7 TOTAL SCORE: 5
2. NOT BEING ABLE TO STOP OR CONTROL WORRYING: SEVERAL DAYS
IF YOU CHECKED OFF ANY PROBLEMS ON THIS QUESTIONNAIRE, HOW DIFFICULT HAVE THESE PROBLEMS MADE IT FOR YOU TO DO YOUR WORK, TAKE CARE OF THINGS AT HOME, OR GET ALONG WITH OTHER PEOPLE: SOMEWHAT DIFFICULT
6. BECOMING EASILY ANNOYED OR IRRITABLE: SEVERAL DAYS
GAD7 TOTAL SCORE: 5
GAD7 TOTAL SCORE: 5
1. FEELING NERVOUS, ANXIOUS, OR ON EDGE: MORE THAN HALF THE DAYS

## 2024-07-17 ASSESSMENT — PATIENT HEALTH QUESTIONNAIRE - PHQ9
SUM OF ALL RESPONSES TO PHQ QUESTIONS 1-9: 3
SUM OF ALL RESPONSES TO PHQ QUESTIONS 1-9: 3
10. IF YOU CHECKED OFF ANY PROBLEMS, HOW DIFFICULT HAVE THESE PROBLEMS MADE IT FOR YOU TO DO YOUR WORK, TAKE CARE OF THINGS AT HOME, OR GET ALONG WITH OTHER PEOPLE: NOT DIFFICULT AT ALL

## 2024-07-17 NOTE — NURSING NOTE
"23 year old  Chief Complaint   Patient presents with    Physical     TDAP, PAP.        Blood pressure 98/62, pulse 64, temperature 98.2  F (36.8  C), temperature source Temporal, resp. rate 14, height 1.688 m (5' 6.46\"), weight 62.1 kg (137 lb), last menstrual period 07/09/2024, SpO2 100%, not currently breastfeeding. Body mass index is 21.81 kg/m .  Patient Active Problem List   Diagnosis    Depression, major, single episode, mild (H24)    BISI (generalized anxiety disorder)    Obsessive-compulsive disorder    History of eating disorder       Wt Readings from Last 2 Encounters:   07/17/24 62.1 kg (137 lb)   12/11/23 56.7 kg (125 lb)     BP Readings from Last 3 Encounters:   07/17/24 98/62   03/06/24 122/80   12/11/23 100/70         Current Outpatient Medications   Medication Sig Dispense Refill    ALPRAZolam (XANAX) 0.25 MG tablet Take 1 tablet (0.25 mg) by mouth daily as needed for anxiety 10 tablet 0    citalopram (CELEXA) 40 MG tablet Take 1 tablet (40 mg) by mouth daily 90 tablet 3    propranolol (INDERAL) 10 MG tablet Take 1 tablet (10 mg) by mouth 3 times daily as needed (Anxiety) 90 tablet 1     No current facility-administered medications for this visit.       Social History     Tobacco Use    Smoking status: Never    Smokeless tobacco: Never   Substance Use Topics    Alcohol use: Yes     Comment: 2x per week    Drug use: Never       Health Maintenance Due   Topic Date Due    CHLAMYDIA SCREENING  Never done    ADVANCE CARE PLANNING  Never done    HIV SCREENING  Never done    HEPATITIS C SCREENING  Never done    PAP  Never done    DTAP/TDAP/TD IMMUNIZATION (3 - Td or Tdap) 12/10/2022    YEARLY PREVENTIVE VISIT  01/19/2023    COVID-19 Vaccine (5 - 2023-24 season) 09/01/2023       No results found for: \"PAP\"      July 17, 2024 4:12 PM    "

## 2024-07-17 NOTE — PROGRESS NOTES
Preventive Care Visit  [facility]  Umesh Rocha MD, Family Medicine  Jul 17, 2024      Assessment & Plan   Problem List Items Addressed This Visit    None  Visit Diagnoses       Need for tetanus, diphtheria, and acellular pertussis (Tdap) vaccine    -  Primary    Relevant Orders    TDAP 7+ (ADACEL,BOOSTRIX) (Completed)    Lipid screening        Relevant Orders    Lipid Profile    Screening for metabolic disorder        Relevant Orders    Basic metabolic panel    Screening for malignant neoplasm of cervix        Relevant Orders    Pap imaged thin layer screen only - recommended age 21 - 24 years    Screen for STD (sexually transmitted disease)        Relevant Orders    NEISSERIA GONORRHOEA PCR    CHLAMYDIA TRACHOMATIS PCR    Encounter for HCV screening test for low risk patient        Relevant Orders    HCV Antibody w/Reflex to HCV RNA    Screening for HIV (human immunodeficiency virus)        Relevant Orders    HIV Antigen Antibody Combo             Patient has been advised of split billing requirements and indicates understanding: Yes       Counseling  Appropriate preventive services were addressed with this patient via screening, questionnaire, or discussion as appropriate for fall prevention, nutrition, physical activity, Tobacco-use cessation, weight loss and cognition.  Checklist reviewing preventive services available has been given to the patient.  Reviewed patient's diet, addressing concerns and/or questions.   She is at risk for lack of exercise and has been provided with information to increase physical activity for the benefit of her well-being.       Umesh Rocha MD  4:42 PM, July 17, 2024        Juan Solomon is a 23 year old, presenting for the following:  Physical (TDAP, PAP. )       HPI  # Health Maintenance  - HIV Screening: pending  - STI Screening: pending  - Hep C Screening: pending  - BP:   BP Readings from Last 3 Encounters:   07/17/24 98/62   03/06/24 122/80   12/11/23 100/70   -  Cholesterol: pending  Recent Labs   Lab Test 01/19/22  0903   CHOL 162   HDL 87   LDL 55   TRIG 102   The ASCVD Risk score (Rina DK, et al., 2019) failed to calculate for the following reasons:    The 2019 ASCVD risk score is only valid for ages 40 to 79  - Diabetes Screening: pending  - Lung Cancer Screening: not indicated  55-81yo w/30py smoking history and currently smoking OR quit within past 15 years:  Low dose CT annually and discontinued once a person has been 15 years tobacco free  - (+) seatbelt use, (+) helmet, (+) smoke detector  - Feels safe at home, denies verbal/physical/emotional abuse in past year: yes  - Last Pap: no previous testing, PAP performed today, results pending          7/10/2024   General Health   How would you rate your overall physical health? Excellent   Feel stress (tense, anxious, or unable to sleep) Rather much      (!) STRESS CONCERN      7/10/2024   Nutrition   Three or more servings of calcium each day? Yes   Diet: Regular (no restrictions)   How many servings of fruit and vegetables per day? (!) 2-3   How many sweetened beverages each day? 0-1            7/10/2024   Exercise   Days per week of moderate/strenous exercise 3 days   Average minutes spent exercising at this level 20 min            7/10/2024   Social Factors   Frequency of gathering with friends or relatives Twice a week   Worry food won't last until get money to buy more No   Food not last or not have enough money for food? No   Do you have housing? (Housing is defined as stable permanent housing and does not include staying ouside in a car, in a tent, in an abandoned building, in an overnight shelter, or couch-surfing.) Yes   Are you worried about losing your housing? No   Lack of transportation? No   Unable to get utilities (heat,electricity)? No            7/10/2024   Dental   Dentist two times every year? Yes            7/10/2024   TB Screening   Were you born outside of the [place]? No        Today's PHQ-9 Score:  "      7/17/2024     4:04 PM   PHQ-9 SCORE   PHQ-9 Total Score MyChart 3 (Minimal depression)   PHQ-9 Total Score 3           7/10/2024   Substance Use   Alcohol more than 3/day or more than 7/wk Not Applicable   Do you use any other substances recreationally? (!) NICOLAS        Social History     Tobacco Use    Smoking status: Never    Smokeless tobacco: Never   Substance Use Topics    Alcohol use: Yes     Comment: 2x per week    Drug use: Never             7/10/2024   One time HIV Screening   Previous HIV test? No          7/10/2024   STI Screening   New sexual partner(s) since last STI/HIV test? No        History of abnormal Pap smear: No - age 21-29 PAP every 3 years recommended             7/10/2024   Contraception/Family Planning   Questions about contraception or family planning No        Past Medical History:   Diagnosis Date    Depression, major, single episode, mild (H24) 3/13/2018    Moderate anxiety 3/13/2018    Obsessive-compulsive disorder 3/13/2018     History reviewed. No pertinent surgical history.    Family History   Problem Relation Age of Onset    No Known Problems Mother     No Known Problems Father     No Known Problems Sister            Review of Systems  Constitutional, HEENT, cardiovascular, pulmonary, gi and gu systems are negative, except as otherwise noted.     Objective    Exam  BP 98/62 (BP Location: Left arm, Patient Position: Sitting, Cuff Size: Adult Regular)   Pulse 64   Temp 98.2  F (36.8  C) (Temporal)   Resp 14   Ht 1.688 m (5' 6.46\")   Wt 62.1 kg (137 lb)   LMP 07/09/2024 (Exact Date)   SpO2 100%   BMI 21.81 kg/m     Estimated body mass index is 21.81 kg/m  as calculated from the following:    Height as of this encounter: 1.688 m (5' 6.46\").    Weight as of this encounter: 62.1 kg (137 lb).    Physical Exam  GENERAL: alert and no distress  NECK: no adenopathy, no asymmetry, masses, or scars  RESP: lungs clear to auscultation - no rales, rhonchi or wheezes  CV: regular rate " and rhythm, normal S1 S2, no S3 or S4, no murmur, click or rub, no peripheral edema  ABDOMEN: soft, nontender, no hepatosplenomegaly, no masses and bowel sounds normal   (female): normal female external genitalia, normal urethral meatus, normal vaginal mucosa  MS: no gross musculoskeletal defects noted, no edema        Signed Electronically by: Umesh Rocha MD    Answers submitted by the patient for this visit:  Patient Health Questionnaire (Submitted on 7/17/2024)  If you checked off any problems, how difficult have these problems made it for you to do your work, take care of things at home, or get along with other people?: Not difficult at all  PHQ9 TOTAL SCORE: 3  BISI-7 (Submitted on 7/17/2024)  BISI 7 TOTAL SCORE: 5

## 2024-07-18 LAB
ANION GAP SERPL CALCULATED.3IONS-SCNC: 9 MMOL/L (ref 7–15)
BUN SERPL-MCNC: 8.7 MG/DL (ref 6–20)
C TRACH DNA SPEC QL NAA+PROBE: NEGATIVE
CALCIUM SERPL-MCNC: 9.1 MG/DL (ref 8.8–10.4)
CHLORIDE SERPL-SCNC: 103 MMOL/L (ref 98–107)
CHOLEST SERPL-MCNC: 179 MG/DL
CREAT SERPL-MCNC: 0.8 MG/DL (ref 0.51–0.95)
EGFRCR SERPLBLD CKD-EPI 2021: >90 ML/MIN/1.73M2
FASTING STATUS PATIENT QL REPORTED: NO
FASTING STATUS PATIENT QL REPORTED: NO
GLUCOSE SERPL-MCNC: 87 MG/DL (ref 70–99)
HCO3 SERPL-SCNC: 27 MMOL/L (ref 22–29)
HCV AB SERPL QL IA: NONREACTIVE
HDLC SERPL-MCNC: 70 MG/DL
HIV 1+2 AB+HIV1 P24 AG SERPL QL IA: NONREACTIVE
LDLC SERPL CALC-MCNC: 97 MG/DL
N GONORRHOEA DNA SPEC QL NAA+PROBE: NEGATIVE
NONHDLC SERPL-MCNC: 109 MG/DL
POTASSIUM SERPL-SCNC: 5 MMOL/L (ref 3.4–5.3)
SODIUM SERPL-SCNC: 139 MMOL/L (ref 135–145)
TRIGL SERPL-MCNC: 60 MG/DL

## 2024-07-24 LAB
BKR LAB AP GYN ADEQUACY: ABNORMAL
BKR LAB AP GYN INTERPRETATION: ABNORMAL
BKR LAB AP HPV REFLEX: NO
BKR LAB AP PREVIOUS ABNORMAL: ABNORMAL
PATH REPORT.COMMENTS IMP SPEC: ABNORMAL
PATH REPORT.COMMENTS IMP SPEC: ABNORMAL
PATH REPORT.RELEVANT HX SPEC: ABNORMAL

## 2024-08-06 ENCOUNTER — MYC REFILL (OUTPATIENT)
Dept: FAMILY MEDICINE | Facility: CLINIC | Age: 24
End: 2024-08-06

## 2024-08-06 DIAGNOSIS — F41.9 ANXIETY: ICD-10-CM

## 2024-08-06 RX ORDER — ALPRAZOLAM 0.25 MG
0.25 TABLET ORAL DAILY PRN
Qty: 10 TABLET | Refills: 0 | Status: SHIPPED | OUTPATIENT
Start: 2024-08-06

## 2024-08-06 NOTE — TELEPHONE ENCOUNTER
Alprazolam (Xanax) 0.25 mg    Last Office Visit: 7/17/24  [place] [facility] [place]  Medication last refilled: 3/26/24 #10 with 0 refill(s)     verified - last fill date: 4/5/24 #10    CSA on File - None    Routing refill request to provider for review/approval because:  Drug not on the [facility] refill protocol     MEGHAN LozanoN, RN, CCM

## 2024-09-16 DIAGNOSIS — F41.9 ANXIETY: ICD-10-CM

## 2024-09-17 RX ORDER — PROPRANOLOL HYDROCHLORIDE 10 MG/1
10 TABLET ORAL 3 TIMES DAILY PRN
Qty: 90 TABLET | Refills: 1 | Status: SHIPPED | OUTPATIENT
Start: 2024-09-17

## 2024-09-17 NOTE — TELEPHONE ENCOUNTER
Medication requested: propranolol (INDERAL) 10 MG tablet   Last office visit: 7/17/24  [facility] appointments: none  Medication last refilled: 7/11/24; 90 + 1 refill  Last qualifying labs:    7/17/2024  4:04 PM   PHQ-9 / BISI-7 Scores 8/2015 to present    BISI-7 Score DocFlow 5      Prescription approved per [facility] Refill Protocol.    Qamar REY, RN  09/17/24 2:56 PM

## 2024-11-16 ENCOUNTER — IMMUNIZATION (OUTPATIENT)
Dept: PEDIATRICS | Facility: CLINIC | Age: 24
End: 2024-11-16
Payer: COMMERCIAL

## 2024-11-16 ENCOUNTER — MYC REFILL (OUTPATIENT)
Dept: FAMILY MEDICINE | Facility: CLINIC | Age: 24
End: 2024-11-16

## 2024-11-16 DIAGNOSIS — F41.9 ANXIETY: ICD-10-CM

## 2024-11-16 PROCEDURE — 91320 SARSCV2 VAC 30MCG TRS-SUC IM: CPT

## 2024-11-16 PROCEDURE — 90480 ADMN SARSCOV2 VAC 1/ONLY CMP: CPT

## 2024-11-16 PROCEDURE — 90471 IMMUNIZATION ADMIN: CPT

## 2024-11-16 PROCEDURE — 90656 IIV3 VACC NO PRSV 0.5 ML IM: CPT

## 2024-11-18 NOTE — TELEPHONE ENCOUNTER
Medication requested: ALPRAZolam (XANAX) 0.25 MG tablet   Last office visit: 7/17/2024  [facility] appointments: none  Medication last refilled: 8/6/2024; 10 tabs + 0 refills; last sold #10 tabs on 8/22/2024 per   Last qualifying labs: n/a    Routing refill request to provider for review/approval because:  Drug not on the [facility] refill protocol     CANDIDO Hanna, RN  11/18/24, 3:26 PM

## 2024-11-19 RX ORDER — ALPRAZOLAM 0.25 MG/1
0.25 TABLET ORAL DAILY PRN
Qty: 8 TABLET | Refills: 0 | Status: SHIPPED | OUTPATIENT
Start: 2024-11-19

## 2024-11-19 NOTE — TELEPHONE ENCOUNTER
reviewed, med refilled as noted.     Neha was seen today for refill request.    Diagnoses and all orders for this visit:    Anxiety  -     ALPRAZolam (XANAX) 0.25 MG tablet; Take 1 tablet (0.25 mg) by mouth daily as needed for anxiety.      Umesh Rocha MD  12:37 PM, November 19, 2024

## 2025-04-20 ENCOUNTER — MYC REFILL (OUTPATIENT)
Dept: FAMILY MEDICINE | Facility: CLINIC | Age: 25
End: 2025-04-20

## 2025-04-20 DIAGNOSIS — F41.9 ANXIETY: ICD-10-CM

## 2025-04-20 DIAGNOSIS — F32.0 DEPRESSION, MAJOR, SINGLE EPISODE, MILD: ICD-10-CM

## 2025-04-22 RX ORDER — CITALOPRAM HYDROBROMIDE 40 MG/1
40 TABLET ORAL DAILY
Qty: 90 TABLET | Refills: 0 | Status: SHIPPED | OUTPATIENT
Start: 2025-04-22

## 2025-04-22 NOTE — TELEPHONE ENCOUNTER
Medication requested: citalopram (CELEXA) 40 MG tablet   Last office visit: 7/17/2024  [facility] appointments: none  Medication last refilled: 3/6/2024; 90 tabs + 3 refills  Last qualifying labs:      7/17/2024  4:04 PM   PHQ-9 and GAD7 Scores    PHQ-9 Total Score 3    BISI-7 Total Score 5      Prescription approved per [facility] Refill Protocol.    CANDIDO Hanna, RN  04/22/25, 3:41 PM

## 2025-08-03 ENCOUNTER — MYC REFILL (OUTPATIENT)
Dept: FAMILY MEDICINE | Facility: CLINIC | Age: 25
End: 2025-08-03

## 2025-08-03 DIAGNOSIS — F41.9 ANXIETY: ICD-10-CM

## 2025-08-03 DIAGNOSIS — F32.0 DEPRESSION, MAJOR, SINGLE EPISODE, MILD: ICD-10-CM

## 2025-08-05 RX ORDER — CITALOPRAM HYDROBROMIDE 40 MG/1
40 TABLET ORAL DAILY
Qty: 90 TABLET | Refills: 0 | Status: SHIPPED | OUTPATIENT
Start: 2025-08-05

## 2025-08-31 ENCOUNTER — HEALTH MAINTENANCE LETTER (OUTPATIENT)
Age: 25
End: 2025-08-31